# Patient Record
Sex: FEMALE | Race: OTHER | Employment: UNEMPLOYED | URBAN - METROPOLITAN AREA
[De-identification: names, ages, dates, MRNs, and addresses within clinical notes are randomized per-mention and may not be internally consistent; named-entity substitution may affect disease eponyms.]

---

## 2017-03-26 NOTE — TELEPHONE ENCOUNTER
Patient is requesting a refill of Singulair 10 MG- #90- 1 tablet by mouth daily. Patient was last seen for an office visit for cough 7-25-16 and per Dr. Donte Benitez progress notes to follow up in 2 weeks. No appointment scheduled as of 3-26-17.   Last refill o

## 2017-03-27 RX ORDER — MONTELUKAST SODIUM 10 MG/1
TABLET ORAL
Qty: 90 TABLET | Refills: 1 | OUTPATIENT
Start: 2017-03-27

## 2017-03-27 NOTE — TELEPHONE ENCOUNTER
Pt contacted and stts that she is no longer having acute allergy issues, denied f/u appt. , and stts that she did not request refill. Pt informed that for additional refills will need have appt prior. Pt verbalized understanding and agreed to plan of care.

## 2017-03-27 NOTE — TELEPHONE ENCOUNTER
Patient last seen on July 25, 2016 and advised to follow-up in 6 months. Refill request denied at this time.

## 2017-05-09 ENCOUNTER — HOSPITAL ENCOUNTER (OUTPATIENT)
Age: 52
Discharge: HOME | End: 2017-05-09
Attending: EMERGENCY MEDICINE
Payer: COMMERCIAL

## 2017-05-09 VITALS
HEIGHT: 63 IN | WEIGHT: 157 LBS | DIASTOLIC BLOOD PRESSURE: 94 MMHG | SYSTOLIC BLOOD PRESSURE: 134 MMHG | BODY MASS INDEX: 27.82 KG/M2 | HEART RATE: 95 BPM | TEMPERATURE: 99 F | RESPIRATION RATE: 16 BRPM | OXYGEN SATURATION: 100 %

## 2017-05-09 DIAGNOSIS — J20.9 ACUTE BRONCHITIS, UNSPECIFIED ORGANISM: Primary | ICD-10-CM

## 2017-05-09 PROCEDURE — 87430 STREP A AG IA: CPT

## 2017-05-09 PROCEDURE — 99214 OFFICE O/P EST MOD 30 MIN: CPT

## 2017-05-09 PROCEDURE — 99213 OFFICE O/P EST LOW 20 MIN: CPT

## 2017-05-09 RX ORDER — AMOXICILLIN 875 MG/1
875 TABLET, COATED ORAL 2 TIMES DAILY
Qty: 20 TABLET | Refills: 0 | Status: SHIPPED | OUTPATIENT
Start: 2017-05-09 | End: 2017-05-09

## 2017-05-09 RX ORDER — BENZONATATE 200 MG/1
200 CAPSULE ORAL 3 TIMES DAILY PRN
Qty: 21 CAPSULE | Refills: 0 | Status: SHIPPED | OUTPATIENT
Start: 2017-05-09 | End: 2017-05-16

## 2017-05-09 RX ORDER — BENZONATATE 200 MG/1
200 CAPSULE ORAL 3 TIMES DAILY PRN
Qty: 21 CAPSULE | Refills: 0 | Status: SHIPPED | OUTPATIENT
Start: 2017-05-09 | End: 2017-05-09

## 2017-05-09 RX ORDER — AMOXICILLIN 875 MG/1
875 TABLET, COATED ORAL 2 TIMES DAILY
Qty: 20 TABLET | Refills: 0 | Status: SHIPPED | OUTPATIENT
Start: 2017-05-09 | End: 2017-05-19

## 2017-05-09 NOTE — ED PROVIDER NOTES
Patient presents with:  Cough/URI      HPI:     Katlyn Khan is a 47year old female who presents for evaluation of a chief complaint of fever, sore throat and cough which is  dry. Location: Respiratory System.  Onset of symptoms was gradual startin Strep  amoxicillin 875 MG Oral Tab   Sig: Take 1 tablet (875 mg total) by mouth 2 (two) times daily. Dispense:  20 tablet   Refill:  0  benzonatate 200 MG Oral Cap   Sig: Take 1 capsule (200 mg total) by mouth 3 (three) times daily as needed for cough.

## 2017-07-10 RX ORDER — LEVOTHYROXINE SODIUM 0.15 MG/1
TABLET ORAL
Qty: 90 TABLET | Refills: 2 | Status: SHIPPED | OUTPATIENT
Start: 2017-07-10 | End: 2018-09-12

## 2017-08-09 ENCOUNTER — OFFICE VISIT (OUTPATIENT)
Dept: INTERNAL MEDICINE CLINIC | Facility: CLINIC | Age: 52
End: 2017-08-09

## 2017-08-09 VITALS
WEIGHT: 154 LBS | SYSTOLIC BLOOD PRESSURE: 122 MMHG | HEART RATE: 76 BPM | DIASTOLIC BLOOD PRESSURE: 79 MMHG | HEIGHT: 64 IN | BODY MASS INDEX: 26.29 KG/M2

## 2017-08-09 DIAGNOSIS — E03.9 ACQUIRED HYPOTHYROIDISM: ICD-10-CM

## 2017-08-09 DIAGNOSIS — E55.9 VITAMIN D DEFICIENCY: ICD-10-CM

## 2017-08-09 DIAGNOSIS — M25.571 ACUTE RIGHT ANKLE PAIN: Primary | ICD-10-CM

## 2017-08-09 PROCEDURE — 99214 OFFICE O/P EST MOD 30 MIN: CPT | Performed by: INTERNAL MEDICINE

## 2017-08-09 PROCEDURE — 99212 OFFICE O/P EST SF 10 MIN: CPT | Performed by: INTERNAL MEDICINE

## 2017-08-09 NOTE — PROGRESS NOTES
Jose Angel Ambrocio is a 47year old female. HPI:   1. Right Ankle Pain    Has been complaining of pain in left elbow last few weeks but had no specific injury. Has been feeling better the last few days. Hurts to lift items mildly.     2. Acquired Hypothy throat are clear  NECK: supple,no adenopathy,no bruits  LUNGS: clear to auscultation  CARDIO: RRR without murmur  GI: good BS's,no masses, HSM or tenderness  EXTREMITIES: no cyanosis, clubbing or edema  4th and 5th nail loeftfoot have thickened and darkene

## 2017-09-12 ENCOUNTER — NURSE TRIAGE (OUTPATIENT)
Dept: INTERNAL MEDICINE CLINIC | Facility: CLINIC | Age: 52
End: 2017-09-12

## 2017-09-12 NOTE — TELEPHONE ENCOUNTER
Per pt peeled skin on foot, baby toe. Now feeling pain with shoe on. Advise given. Pt verbalized understanding.    Reason for Disposition  • Caused by known bunion(s), corns, claw toes, mallet toes, or hammertoes    Protocols used: TOE PAIN-A-OH

## 2017-09-12 NOTE — TELEPHONE ENCOUNTER
Pt. has callus on both feet and pt. is having pain on R Foot after peeling off dead skin. Pt. States that it does not look infected but it does hurt when she has her shoe on. Pt. Wants to know if there is something that the dr can give her?

## 2017-10-23 ENCOUNTER — APPOINTMENT (OUTPATIENT)
Dept: LAB | Age: 52
End: 2017-10-23
Attending: INTERNAL MEDICINE
Payer: COMMERCIAL

## 2017-10-23 PROCEDURE — 80053 COMPREHEN METABOLIC PANEL: CPT | Performed by: INTERNAL MEDICINE

## 2017-10-23 PROCEDURE — 84443 ASSAY THYROID STIM HORMONE: CPT | Performed by: INTERNAL MEDICINE

## 2017-10-23 PROCEDURE — 81001 URINALYSIS AUTO W/SCOPE: CPT | Performed by: INTERNAL MEDICINE

## 2017-10-23 PROCEDURE — 85025 COMPLETE CBC W/AUTO DIFF WBC: CPT | Performed by: INTERNAL MEDICINE

## 2017-10-23 PROCEDURE — 80061 LIPID PANEL: CPT | Performed by: INTERNAL MEDICINE

## 2017-10-23 PROCEDURE — 82306 VITAMIN D 25 HYDROXY: CPT | Performed by: INTERNAL MEDICINE

## 2018-03-05 ENCOUNTER — OFFICE VISIT (OUTPATIENT)
Dept: INTERNAL MEDICINE CLINIC | Facility: CLINIC | Age: 53
End: 2018-03-05

## 2018-03-05 ENCOUNTER — HOSPITAL ENCOUNTER (OUTPATIENT)
Dept: GENERAL RADIOLOGY | Age: 53
Discharge: HOME | End: 2018-03-05
Attending: INTERNAL MEDICINE
Payer: COMMERCIAL

## 2018-03-05 VITALS
HEIGHT: 64 IN | SYSTOLIC BLOOD PRESSURE: 114 MMHG | RESPIRATION RATE: 20 BRPM | TEMPERATURE: 98 F | BODY MASS INDEX: 27.08 KG/M2 | WEIGHT: 158.63 LBS | DIASTOLIC BLOOD PRESSURE: 74 MMHG | HEART RATE: 78 BPM

## 2018-03-05 DIAGNOSIS — M54.41 RIGHT-SIDED LOW BACK PAIN WITH RIGHT-SIDED SCIATICA, UNSPECIFIED CHRONICITY: ICD-10-CM

## 2018-03-05 DIAGNOSIS — L81.9 DISCOLORATION OF SKIN OF TOE: Primary | ICD-10-CM

## 2018-03-05 PROCEDURE — 99212 OFFICE O/P EST SF 10 MIN: CPT | Performed by: INTERNAL MEDICINE

## 2018-03-05 PROCEDURE — 99214 OFFICE O/P EST MOD 30 MIN: CPT | Performed by: INTERNAL MEDICINE

## 2018-03-05 PROCEDURE — 72100 X-RAY EXAM L-S SPINE 2/3 VWS: CPT | Performed by: INTERNAL MEDICINE

## 2018-03-05 RX ORDER — METHYLPREDNISOLONE 4 MG/1
TABLET ORAL
Qty: 1 KIT | Refills: 0 | Status: SHIPPED | OUTPATIENT
Start: 2018-03-05 | End: 2018-05-14

## 2018-03-05 RX ORDER — CLOTRIMAZOLE 1 %
CREAM (GRAM) TOPICAL 2 TIMES DAILY
COMMUNITY
Start: 2017-12-18 | End: 2018-05-14

## 2018-03-05 RX ORDER — CYCLOBENZAPRINE HCL 5 MG
5 TABLET ORAL NIGHTLY PRN
Qty: 20 TABLET | Refills: 0 | Status: SHIPPED | OUTPATIENT
Start: 2018-03-05 | End: 2018-05-14

## 2018-03-05 NOTE — PROGRESS NOTES
HPI:    Patient ID: Cori Sorto is a 47year old female. Fungus Nails   This is a chronic (discolored   and  cream for  fungus not hepling  ) problem. The current episode started more than 1 year ago. The problem has been gradually worsening.  Rosita daily.   Disp:  Rfl:    methylPREDNISolone (MEDROL) 4 MG Oral Tablet Therapy Pack As directed. Disp: 1 kit Rfl: 0   Cyclobenzaprine HCl 5 MG Oral Tab Take 1 tablet (5 mg total) by mouth nightly as needed for Muscle spasms.  Disp: 20 tablet Rfl: 0   LEVOTHYR bx    Right-sided low back pain with right-sided sciatica, unspecified chronicity  -   Pt education weightReduction   refer  To physiatry  -PT  xr  Lumbar  Spine     Flexeril   5 mg qhs  As  Needed   Medrol  Dose  Pack   Side effects and directions of medi

## 2018-03-08 ENCOUNTER — TELEPHONE (OUTPATIENT)
Dept: OTHER | Age: 53
End: 2018-03-08

## 2018-03-08 NOTE — TELEPHONE ENCOUNTER
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     Advised patient t

## 2018-04-02 ENCOUNTER — OFFICE VISIT (OUTPATIENT)
Dept: PODIATRY CLINIC | Facility: CLINIC | Age: 53
End: 2018-04-02

## 2018-04-02 DIAGNOSIS — B35.1 ONYCHOMYCOSIS: Primary | ICD-10-CM

## 2018-04-02 PROCEDURE — 99212 OFFICE O/P EST SF 10 MIN: CPT | Performed by: PODIATRIST

## 2018-04-02 PROCEDURE — 99242 OFF/OP CONSLTJ NEW/EST SF 20: CPT | Performed by: PODIATRIST

## 2018-04-02 NOTE — PROGRESS NOTES
HPI:    Patient ID: Noman Danielle is a 46year old female.     HPI  This 54-year-old female presents as a new patient to me on consult from [address] patient presents primarily with a concern associated with the fourth left toe although she thinks t appropriate         ASSESSMENT/PLAN:   Onychomycosis  (primary encounter diagnosis)    No orders of the defined types were placed in this encounter.       Meds This Visit:  No prescriptions requested or ordered in this encounter    Imaging & Referrals:  Non

## 2018-05-14 ENCOUNTER — OFFICE VISIT (OUTPATIENT)
Dept: INTERNAL MEDICINE CLINIC | Facility: CLINIC | Age: 53
End: 2018-05-14

## 2018-05-14 VITALS
WEIGHT: 155 LBS | HEART RATE: 81 BPM | DIASTOLIC BLOOD PRESSURE: 77 MMHG | HEIGHT: 64 IN | BODY MASS INDEX: 26.46 KG/M2 | SYSTOLIC BLOOD PRESSURE: 120 MMHG

## 2018-05-14 DIAGNOSIS — K29.00 ACUTE GASTRITIS WITHOUT HEMORRHAGE, UNSPECIFIED GASTRITIS TYPE: ICD-10-CM

## 2018-05-14 DIAGNOSIS — L29.0 ANAL ITCHING: ICD-10-CM

## 2018-05-14 DIAGNOSIS — K64.9 HEMORRHOIDS, UNSPECIFIED HEMORRHOID TYPE: Primary | ICD-10-CM

## 2018-05-14 PROCEDURE — 99212 OFFICE O/P EST SF 10 MIN: CPT | Performed by: INTERNAL MEDICINE

## 2018-05-14 PROCEDURE — 99213 OFFICE O/P EST LOW 20 MIN: CPT | Performed by: INTERNAL MEDICINE

## 2018-05-14 RX ORDER — PANTOPRAZOLE SODIUM 40 MG/1
40 TABLET, DELAYED RELEASE ORAL
Qty: 30 TABLET | Refills: 0 | Status: SHIPPED | OUTPATIENT
Start: 2018-05-14 | End: 2018-06-10

## 2018-05-14 NOTE — PATIENT INSTRUCTIONS
Treating Hemorrhoids: Removal  If your symptoms persist, your healthcare provider may recommend removing the hemorrhoid. This can be done in your healthcare provider's office or at a surgical center. In most cases, no special preparation is needed.  Keep · Numbing the hemorrhoid. You’ll be asked to lie or kneel on a table. The hemorrhoid is then injected with a local anesthetic. This may cause some discomfort for a moment.  But within a short time your healthcare provider will be able to remove the hemorrho · Take sitz baths. Taking a sitz bath means sitting in a few inches of warm bath water. Soaking for 10 minutes twice a day can provide welcome relief from painful hemorrhoids. It can also help the area stay clean.   · Develop good bowel habits. Use the bath Along with a high-fiber diet, drinking more water can help ease constipation. This is because insoluble fiber absorbs water, making stools soft and bulky. Be sure to drink plenty of water throughout the day.  Drinking fruit juices, such as prune juice or ap © 5700-9990 [employer] [address]. [address] [facility] [place] [facility] [place]. All rights reserved. This information is not intended as a substitute for professional medical care. Always follow your healthcare professional's instructions.         Jesus Rodriguez · Internal hemorrhoids often happen in clusters around the wall of the anal canal. They are usually painless. But they may prolapse (protrude out of the anus) due to straining or pressure from hard stool.  After the bowel movement is over, they may then red

## 2018-05-14 NOTE — PROGRESS NOTES
Destiny Anderson is a 46year old female.   Patient presents with:  Loose Stools: after eating spicy food and feels burning sensation      HPI:   Patient comes as an urgent visit  Usually sees Dr. Radha Thompson  c/c burning in the anal area  c/o burning and itc denies abdominal pain and denies heartburn, nausea or vomiting, no diarrhea or constipation  MUS: No back pain, joint pain, + muscle pain- right leg pain   NEURO: denies headaches , anxiety, depression    EXAM:   /77 (BP Location: Right arm, Patient

## 2018-05-18 ENCOUNTER — OFFICE VISIT (OUTPATIENT)
Dept: PODIATRY CLINIC | Facility: CLINIC | Age: 53
End: 2018-05-18

## 2018-05-18 VITALS — HEIGHT: 64 IN | BODY MASS INDEX: 26.46 KG/M2 | WEIGHT: 155 LBS

## 2018-05-18 DIAGNOSIS — B35.1 ONYCHOMYCOSIS: Primary | ICD-10-CM

## 2018-05-18 PROCEDURE — 99212 OFFICE O/P EST SF 10 MIN: CPT | Performed by: PODIATRIST

## 2018-05-18 NOTE — PROGRESS NOTES
HPI:    Patient ID: Aleida Bae is a 46year old female. HPI  This 50-year-old female presents for reevaluation in reference to the fourth and fifth toenails of the left foot.   She has no pain and has not been putting medications on them  Review of

## 2018-05-19 ENCOUNTER — HOSPITAL (OUTPATIENT)
Dept: OTHER | Age: 53
End: 2018-05-19
Attending: EMERGENCY MEDICINE

## 2018-06-10 RX ORDER — PANTOPRAZOLE SODIUM 40 MG/1
TABLET, DELAYED RELEASE ORAL
Qty: 30 TABLET | Refills: 0 | Status: SHIPPED | OUTPATIENT
Start: 2018-06-10 | End: 2019-02-06

## 2018-06-10 NOTE — TELEPHONE ENCOUNTER
Refill Protocol Appointment Criteria  · Appointment scheduled in the past 12 months or in the next 3 months  Recent Outpatient Visits            3 weeks ago Onychomycosis    [facility], [address]  [place], [place]

## 2018-09-13 RX ORDER — LEVOTHYROXINE SODIUM 0.15 MG/1
TABLET ORAL
Qty: 90 TABLET | Refills: 0 | Status: SHIPPED | OUTPATIENT
Start: 2018-09-13 | End: 2019-06-06

## 2018-09-13 NOTE — TELEPHONE ENCOUNTER
Requested Prescriptions     Pending Prescriptions Disp Refills   • LEVOTHYROXINE SODIUM 150 MCG Oral Tab [Pharmacy Med Name: LEVOTHYROXINE 150 MCG TABLET] 90 tablet 0     Sig: TAKE 1/2 TABLETS (75 MCG TOTAL) BY MOUTH BEFORE BREAKFAST.          Refill approv

## 2018-09-13 NOTE — TELEPHONE ENCOUNTER
Hypothyroid Medications  Protocol Criteria:  Appointment scheduled in the past 12 months or the next 3 months  TSH resulted in the past 12 months that is normal  Recent Outpatient Visits            3 months ago [facility]ychomycosis    [facility]

## 2018-11-08 ENCOUNTER — TELEPHONE (OUTPATIENT)
Dept: INTERNAL MEDICINE CLINIC | Facility: CLINIC | Age: 53
End: 2018-11-08

## 2018-11-08 DIAGNOSIS — Z01.419 WELL WOMAN EXAM: Primary | ICD-10-CM

## 2018-11-08 NOTE — TELEPHONE ENCOUNTER
Orders entered. RN placed call to patient to advise of orders and to offer appt for annual exam.  Per patient, she is scheduled for pap and annual exam with PCP office on 11/20. Pt is undecided whether she would like to schedule with [facility] instead.   RN a

## 2018-11-13 ENCOUNTER — TELEPHONE (OUTPATIENT)
Dept: INTERNAL MEDICINE CLINIC | Facility: CLINIC | Age: 53
End: 2018-11-13

## 2018-11-13 NOTE — TELEPHONE ENCOUNTER
Pt requesting orders for annual mammogram be added to the chart. Please call back with confirmation once added. Pt states that is she does not answer that a detailed message can be left on her VM.

## 2018-11-21 ENCOUNTER — OFFICE VISIT (OUTPATIENT)
Dept: OBGYN CLINIC | Facility: CLINIC | Age: 53
End: 2018-11-21
Payer: COMMERCIAL

## 2018-11-21 VITALS
DIASTOLIC BLOOD PRESSURE: 85 MMHG | HEART RATE: 80 BPM | WEIGHT: 155 LBS | BODY MASS INDEX: 27 KG/M2 | SYSTOLIC BLOOD PRESSURE: 125 MMHG

## 2018-11-21 DIAGNOSIS — Z01.419 ENCOUNTER FOR GYNECOLOGICAL EXAMINATION WITHOUT ABNORMAL FINDING: Primary | ICD-10-CM

## 2018-11-21 PROCEDURE — 99396 PREV VISIT EST AGE 40-64: CPT | Performed by: OBSTETRICS & GYNECOLOGY

## 2018-11-21 NOTE — PROGRESS NOTES
HPI:    Patient ID: Andrew Arguelles is a 46year old female. Patient here for routine exam.  No C/Os. Has order for mammogram.  To schedule. LPS 4/2016, WNL with - HPV. Next pap due next year.   Daughter finishing first semester of Pre-med at [employer] for gynecological examination without abnormal finding  (primary encounter diagnosis)   F/U Labs. Encouraged monthly SBE. Discussed diet and exercise. No orders of the defined types were placed in this encounter.       Meds This Visit:  Requested Mickie Szymanski

## 2018-12-12 ENCOUNTER — NURSE TRIAGE (OUTPATIENT)
Dept: OTHER | Age: 53
End: 2018-12-12

## 2018-12-12 RX ORDER — VALACYCLOVIR HYDROCHLORIDE 500 MG/1
500 TABLET, FILM COATED ORAL 2 TIMES DAILY
Qty: 6 TABLET | Refills: 2 | Status: SHIPPED | OUTPATIENT
Start: 2018-12-12 | End: 2019-07-22

## 2018-12-12 RX ORDER — ACYCLOVIR 50 MG/G
1 CREAM TOPICAL
Qty: 5 G | Refills: 2 | Status: SHIPPED | OUTPATIENT
Start: 2018-12-12 | End: 2019-07-22

## 2018-12-12 NOTE — TELEPHONE ENCOUNTER
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  Has

## 2019-01-04 ENCOUNTER — TELEPHONE (OUTPATIENT)
Dept: OBGYN CLINIC | Facility: CLINIC | Age: 54
End: 2019-01-04

## 2019-01-22 ENCOUNTER — HOSPITAL ENCOUNTER (OUTPATIENT)
Dept: MAMMOGRAPHY | Facility: HOSPITAL | Age: 54
Discharge: HOME | End: 2019-01-22
Attending: OBSTETRICS & GYNECOLOGY
Payer: COMMERCIAL

## 2019-01-22 DIAGNOSIS — Z01.419 WELL WOMAN EXAM: ICD-10-CM

## 2019-01-22 PROCEDURE — 77063 BREAST TOMOSYNTHESIS BI: CPT | Performed by: OBSTETRICS & GYNECOLOGY

## 2019-01-22 PROCEDURE — 77067 SCR MAMMO BI INCL CAD: CPT | Performed by: OBSTETRICS & GYNECOLOGY

## 2019-02-04 ENCOUNTER — NURSE TRIAGE (OUTPATIENT)
Dept: OTHER | Age: 54
End: 2019-02-04

## 2019-02-04 NOTE — TELEPHONE ENCOUNTER
appt made for Wednesday- c/c rigth ingrown toenail-pain, no redness, left breast pain x 1 month, had TRACY -norm   Reason for Disposition  • Patient wants to be seen    Protocols used: TOE PAIN-A-OH

## 2019-02-06 ENCOUNTER — OFFICE VISIT (OUTPATIENT)
Dept: INTERNAL MEDICINE CLINIC | Facility: CLINIC | Age: 54
End: 2019-02-06
Payer: COMMERCIAL

## 2019-02-06 VITALS
WEIGHT: 155.81 LBS | HEART RATE: 80 BPM | HEIGHT: 64 IN | RESPIRATION RATE: 20 BRPM | DIASTOLIC BLOOD PRESSURE: 81 MMHG | TEMPERATURE: 98 F | BODY MASS INDEX: 26.6 KG/M2 | SYSTOLIC BLOOD PRESSURE: 123 MMHG

## 2019-02-06 DIAGNOSIS — L60.0 INGROWING TOENAIL: ICD-10-CM

## 2019-02-06 DIAGNOSIS — R92.2 DENSE BREASTS: Primary | ICD-10-CM

## 2019-02-06 DIAGNOSIS — M94.0 COSTOCHONDRITIS: ICD-10-CM

## 2019-02-06 DIAGNOSIS — M25.532 LEFT WRIST PAIN: ICD-10-CM

## 2019-02-06 PROCEDURE — 99212 OFFICE O/P EST SF 10 MIN: CPT | Performed by: INTERNAL MEDICINE

## 2019-02-06 PROCEDURE — 99214 OFFICE O/P EST MOD 30 MIN: CPT | Performed by: INTERNAL MEDICINE

## 2019-02-07 PROBLEM — R92.2 DENSE BREASTS: Status: ACTIVE | Noted: 2019-02-07

## 2019-02-07 PROBLEM — L60.0 INGROWING TOENAIL: Status: ACTIVE | Noted: 2019-02-07

## 2019-02-07 PROBLEM — R92.30 DENSE BREASTS: Status: ACTIVE | Noted: 2019-02-07

## 2019-02-07 NOTE — PROGRESS NOTES
HPI:    Patient ID: Shimon Washington is a 46year old female. Patient presents with:  Breast Pain: Pt state that she has had left breast pain for the past 2 months that comes and goes.    Ingrown Toenail: Pt state that she has the right great toe that Disp: 6 tablet Rfl: 2   Acyclovir 5 % External Cream Apply 1 Application topically every 3 (three) hours. Disp: 5 g Rfl: 2   LEVOTHYROXINE SODIUM 150 MCG Oral Tab TAKE 1/2 TABLETS (75 MCG TOTAL) BY MOUTH BEFORE BREAKFAST.  Disp: 90 tablet Rfl: 0     Allergi warm and dry. toenails- normal no  erythema or  Tenderness  -nails or skin around    Psychiatric: She has a normal mood and affect. Her behavior is normal.   Nursing note and vitals reviewed. Blood pressure 123/81, pulse 80, temperature 98.1 °F (36.

## 2019-03-09 NOTE — TELEPHONE ENCOUNTER
Please review; protocol failed.     Hypothyroid Medications  Protocol Criteria:  Appointment scheduled in the past 12 months or the next 3 months  TSH resulted in the past 12 months that is normal  Recent Outpatient Visits            1 month ago Dense breasts    [facility], [address] [facility], [place] Oanh Elliott MD    Office Visit    3 months ago Encounter for gynecological examination without abnormal finding    [facility], [facility], [address] [facility], [place], Leatha Rivero MD    Office Visit    9 months ago Onychomycosis    [facility], [address] [facility], [address] [place] [address]    Office Visit    9 months ago Hemorrhoids, unspecified hemorrhoid type    [facility], [address] [facility], Tom Ramirez MD    Office Visit    11 months ago Onychomycosis    [facility], [address] [facility],[address], [address] [facility], [place], [place]    Office Visit          Lab Results   Component Value Date    TSH 1.68 10/23/2017

## 2019-03-10 NOTE — TELEPHONE ENCOUNTER
Please review; protocol failed.     Hypothyroid Medications  Protocol Criteria:  Appointment scheduled in the past 12 months or the next 3 months  TSH resulted in the past 12 months that is normal  Recent Outpatient Visits            1 month ago Dense breasts    [facility], [address] [place] [address], [place] Judye Daniels, MD    Office Visit    3 months ago Encounter for gynecological examination without abnormal finding    [facility], [address] [facility], [place], Birgit Merchant MD    Office Visit    9 months ago Onychomycosis    [facility], [address], [address] [place], [place]    Office Visit    9 months ago Hemorrhoids, unspecified hemorrhoid type    [facility], [facility], [address] [place], Arnita Collet, MD    Office Visit    11 months ago Onychomycosis    [facility], [address], [address] [place], [place]    Office Visit          Lab Results   Component Value Date    TSH 1.68 10/23/2017

## 2019-05-22 RX ORDER — LEVOTHYROXINE SODIUM 0.15 MG/1
TABLET ORAL
Qty: 90 TABLET | Refills: 0 | OUTPATIENT
Start: 2019-05-22

## 2019-06-06 NOTE — TELEPHONE ENCOUNTER
Pt is requesting refill , pt is out of medication     LEVOTHYROXINE SODIUM 150 MCG Oral Tab TAKE 1/2 TABLETS (75 MCG TOTAL) BY MOUTH BEFORE BREAKFAST.  Disp: 90 tablet Rfl: 0

## 2019-06-07 RX ORDER — LEVOTHYROXINE SODIUM 0.15 MG/1
TABLET ORAL
Qty: 90 TABLET | Refills: 0 | Status: SHIPPED | OUTPATIENT
Start: 2019-06-07 | End: 2019-12-10

## 2019-07-22 ENCOUNTER — OFFICE VISIT (OUTPATIENT)
Dept: INTERNAL MEDICINE CLINIC | Facility: CLINIC | Age: 54
End: 2019-07-22
Payer: COMMERCIAL

## 2019-07-22 ENCOUNTER — NURSE TRIAGE (OUTPATIENT)
Dept: OTHER | Age: 54
End: 2019-07-22

## 2019-07-22 VITALS
TEMPERATURE: 99 F | HEIGHT: 64 IN | SYSTOLIC BLOOD PRESSURE: 131 MMHG | BODY MASS INDEX: 26.32 KG/M2 | WEIGHT: 154.19 LBS | HEART RATE: 85 BPM | DIASTOLIC BLOOD PRESSURE: 85 MMHG

## 2019-07-22 DIAGNOSIS — A60.09 GENITAL HERPES IN WOMEN: ICD-10-CM

## 2019-07-22 DIAGNOSIS — J02.0 PHARYNGITIS DUE TO STREPTOCOCCUS SPECIES: ICD-10-CM

## 2019-07-22 DIAGNOSIS — R05.9 COUGH: ICD-10-CM

## 2019-07-22 DIAGNOSIS — H66.90 ACUTE OTITIS MEDIA, UNSPECIFIED OTITIS MEDIA TYPE: Primary | ICD-10-CM

## 2019-07-22 PROCEDURE — 99214 OFFICE O/P EST MOD 30 MIN: CPT | Performed by: INTERNAL MEDICINE

## 2019-07-22 PROCEDURE — 99212 OFFICE O/P EST SF 10 MIN: CPT | Performed by: INTERNAL MEDICINE

## 2019-07-22 RX ORDER — ACYCLOVIR 50 MG/G
1 CREAM TOPICAL
Qty: 5 G | Refills: 2 | Status: SHIPPED | OUTPATIENT
Start: 2019-07-22 | End: 2020-10-14 | Stop reason: ALTCHOICE

## 2019-07-22 RX ORDER — VALACYCLOVIR HYDROCHLORIDE 500 MG/1
500 TABLET, FILM COATED ORAL 2 TIMES DAILY
Qty: 6 TABLET | Refills: 2 | Status: SHIPPED | OUTPATIENT
Start: 2019-07-22 | End: 2020-09-09 | Stop reason: ALTCHOICE

## 2019-07-22 RX ORDER — BENZONATATE 100 MG/1
100 CAPSULE ORAL 2 TIMES DAILY PRN
Qty: 10 CAPSULE | Refills: 0 | Status: SHIPPED | OUTPATIENT
Start: 2019-07-22 | End: 2019-08-06

## 2019-07-22 RX ORDER — AMOXICILLIN AND CLAVULANATE POTASSIUM 875; 125 MG/1; MG/1
1 TABLET, FILM COATED ORAL 2 TIMES DAILY
Qty: 14 TABLET | Refills: 0 | Status: SHIPPED | OUTPATIENT
Start: 2019-07-22 | End: 2019-07-29

## 2019-07-22 NOTE — TELEPHONE ENCOUNTER
Action Requested: Summary for Provider     []  Critical Lab, Recommendations Needed  [] Need Additional Advice  []   FYI    []   Need Orders  [] Need Medications Sent to Pharmacy  []  Other     SUMMARY: pt asking to schedule appt today, states she has coug

## 2019-07-22 NOTE — PROGRESS NOTES
Demi Villagran is a 53year old female.   Patient presents with:  URI: cough, congestion, sneezing  and watery eyes       HPI:   Pt comes as an urgent visit   C/c cold symp x 3 days   C/o very bad cold x 3 days -- chest pain from coughing so much , ea Position: Sitting, Cuff Size: adult)   Pulse 85   Temp 99.1 °F (37.3 °C) (Oral)   Ht 5' 4\" (1.626 m)   Wt 154 lb 3.2 oz (69.9 kg)   LMP 01/05/2016   BMI 26.47 kg/m²   GENERAL: well developed, well nourished,in no apparent distress  SKIN: no rashes,no susp

## 2019-07-22 NOTE — PATIENT INSTRUCTIONS
Preventing Common Respiratory Infections    Respiratory infections such as colds and influenza (“the flu”) are common in winter. These infections are often caused by viruses. They may share some symptoms, but not all respiratory infections are the same. A flu vaccine protects you from influenza (but not other colds or infections). Get a vaccine each fall, before flu season starts. This can be done at a clinic, healthcare provider’s office, drugstore, senior center, or through your workplace.   Get pneumoco

## 2019-07-26 ENCOUNTER — TELEPHONE (OUTPATIENT)
Dept: INTERNAL MEDICINE CLINIC | Facility: CLINIC | Age: 54
End: 2019-07-26

## 2019-07-26 RX ORDER — GUAIFENESIN AND CODEINE PHOSPHATE 100; 10 MG/5ML; MG/5ML
5 SOLUTION ORAL 2 TIMES DAILY PRN
Qty: 118 ML | Refills: 0 | Status: SHIPPED | OUTPATIENT
Start: 2019-07-26 | End: 2019-08-06

## 2019-07-26 NOTE — TELEPHONE ENCOUNTER
Patient called reports she has no improvement , had OV with MA on 7/22/19    Has been taking her antibiotics and benzonatate with very little relief.      Reports has runny nose draining,  Constant  productive cough with yellow sputum    Has non cardiac angelica

## 2019-07-26 NOTE — TELEPHONE ENCOUNTER
Call patient and spoke to her–chest hurts from coughing and head hurts   Pills not helping , she states that she was given a liquid which helped with her cough by Dr. Griselda Dyson years ago and would like to try that because she knows it will help her  Review

## 2019-08-06 ENCOUNTER — OFFICE VISIT (OUTPATIENT)
Dept: INTERNAL MEDICINE CLINIC | Facility: CLINIC | Age: 54
End: 2019-08-06
Payer: COMMERCIAL

## 2019-08-06 VITALS
DIASTOLIC BLOOD PRESSURE: 68 MMHG | HEIGHT: 64 IN | HEART RATE: 88 BPM | SYSTOLIC BLOOD PRESSURE: 110 MMHG | WEIGHT: 152.81 LBS | BODY MASS INDEX: 26.09 KG/M2

## 2019-08-06 DIAGNOSIS — M79.671 RIGHT FOOT PAIN: ICD-10-CM

## 2019-08-06 DIAGNOSIS — E03.9 ACQUIRED HYPOTHYROIDISM: Primary | ICD-10-CM

## 2019-08-06 PROCEDURE — 99212 OFFICE O/P EST SF 10 MIN: CPT | Performed by: INTERNAL MEDICINE

## 2019-08-06 PROCEDURE — 99213 OFFICE O/P EST LOW 20 MIN: CPT | Performed by: INTERNAL MEDICINE

## 2019-08-06 NOTE — PROGRESS NOTES
Kasi Matthew is a 53year old female. Patient presents with:   Foot Pain: right started 3 months ago       HPI:   Pt comes as an urgent visit   C/c foot pain  C/o right foot pain x 3 mns -come and goes   No falls trauma or injury that she is aware 152 lb 12.8 oz (69.3 kg)   LMP 01/05/2016   BMI 26.23 kg/m²   GENERAL: well developed, well nourished,in no apparent distress  SKIN: no rashes,no suspicious lesions  HEENT: atraumatic, normocephalic  LUNGS: clear to auscultation, no wheeze  CARDIO: RRR wit

## 2019-08-06 NOTE — PATIENT INSTRUCTIONS
Hypothyroidism    You have hypothyroidism. This means your thyroid gland is not making enough thyroid hormone. This hormone is vital to body growth and metabolism. If you don’t make enough, many body processes slow down.  This can cause symptoms throughou · Don’t take other medicines with your thyroid hormone pill without checking with your provider first.  · Tell your provider if you have any side effects from your medicines that bother you, especially any chest pain or irregular heartbeats.   · Never freeman

## 2019-08-14 ENCOUNTER — OFFICE VISIT (OUTPATIENT)
Dept: INTERNAL MEDICINE CLINIC | Facility: CLINIC | Age: 54
End: 2019-08-14
Payer: COMMERCIAL

## 2019-08-14 VITALS
WEIGHT: 150 LBS | BODY MASS INDEX: 25.61 KG/M2 | HEART RATE: 87 BPM | HEIGHT: 64 IN | DIASTOLIC BLOOD PRESSURE: 80 MMHG | SYSTOLIC BLOOD PRESSURE: 122 MMHG

## 2019-08-14 DIAGNOSIS — R05.9 COUGH: Primary | ICD-10-CM

## 2019-08-14 PROCEDURE — 99212 OFFICE O/P EST SF 10 MIN: CPT | Performed by: INTERNAL MEDICINE

## 2019-08-14 PROCEDURE — 99214 OFFICE O/P EST MOD 30 MIN: CPT | Performed by: INTERNAL MEDICINE

## 2019-08-14 RX ORDER — FEXOFENADINE HCL 180 MG/1
180 TABLET ORAL DAILY
Qty: 30 TABLET | Refills: 0 | Status: SHIPPED | OUTPATIENT
Start: 2019-08-14 | End: 2020-09-09

## 2019-08-14 RX ORDER — FLUTICASONE PROPIONATE 50 MCG
2 SPRAY, SUSPENSION (ML) NASAL DAILY
Qty: 1 BOTTLE | Refills: 0 | Status: SHIPPED | OUTPATIENT
Start: 2019-08-14 | End: 2019-09-05

## 2019-08-14 NOTE — PROGRESS NOTES
HPI:    Patient ID: Charlotte Desouza is a 53year old female.   Patient presents with:  Cough: follow up    Patient in office today for cough -patient states cough is lasting for over 1 month ,was given  antibiotic and the cough syrup did not help at al Negative for abdominal pain, constipation, diarrhea, heartburn, nausea and vomiting. Genitourinary: Negative for dysuria and frequency. Musculoskeletal: Negative for myalgias. Skin: Negative for pallor and rash.    Neurological: Positive for headaches reactive to light. Conjunctivae, EOM and lids are normal. Right eye exhibits no discharge. Left eye exhibits no discharge. Right conjunctiva is not injected. Left conjunctiva is not injected. No scleral icterus. Neck: Neck supple. No JVD present.  No thyr 0     Si sprays by Each Nare route daily.  Apply two puffs in each nostril once daily for 1-2 weeks then as needed       Imaging & Referrals:  None       PB#[id number]

## 2019-09-05 RX ORDER — FLUTICASONE PROPIONATE 50 MCG
SPRAY, SUSPENSION (ML) NASAL
Qty: 3 BOTTLE | Refills: 1 | Status: SHIPPED | OUTPATIENT
Start: 2019-09-05

## 2019-12-10 RX ORDER — LEVOTHYROXINE SODIUM 0.15 MG/1
TABLET ORAL
Qty: 45 TABLET | Refills: 1 | Status: SHIPPED | OUTPATIENT
Start: 2019-12-10 | End: 2020-06-09

## 2020-06-09 RX ORDER — LEVOTHYROXINE SODIUM 0.15 MG/1
TABLET ORAL
Qty: 45 TABLET | Refills: 1 | Status: SHIPPED | OUTPATIENT
Start: 2020-06-09 | End: 2020-12-18

## 2020-08-19 ENCOUNTER — OFFICE VISIT (OUTPATIENT)
Dept: INTERNAL MEDICINE CLINIC | Facility: CLINIC | Age: 55
End: 2020-08-19

## 2020-08-19 VITALS
HEIGHT: 64 IN | WEIGHT: 152.19 LBS | BODY MASS INDEX: 25.98 KG/M2 | DIASTOLIC BLOOD PRESSURE: 80 MMHG | HEART RATE: 79 BPM | SYSTOLIC BLOOD PRESSURE: 126 MMHG

## 2020-08-19 DIAGNOSIS — G47.9 SLEEPING DIFFICULTIES: ICD-10-CM

## 2020-08-19 DIAGNOSIS — E03.9 ACQUIRED HYPOTHYROIDISM: Primary | ICD-10-CM

## 2020-08-19 DIAGNOSIS — Z00.00 PHYSICAL EXAM: ICD-10-CM

## 2020-08-19 PROCEDURE — 3074F SYST BP LT 130 MM HG: CPT | Performed by: INTERNAL MEDICINE

## 2020-08-19 PROCEDURE — 99213 OFFICE O/P EST LOW 20 MIN: CPT | Performed by: INTERNAL MEDICINE

## 2020-08-19 PROCEDURE — 3008F BODY MASS INDEX DOCD: CPT | Performed by: INTERNAL MEDICINE

## 2020-08-19 PROCEDURE — 3079F DIAST BP 80-89 MM HG: CPT | Performed by: INTERNAL MEDICINE

## 2020-08-19 NOTE — PROGRESS NOTES
HPI:    Patient ID: Nat Epps is a 63year old female. Patient presents with:  Sleep Problem: has not been able to sleep for four days, neck pain also began 4 days ago     Patient in office today for sleep problem  , pt state lost job  3 /20 . as needed 30 tablet 0   • valACYclovir HCl (VALTREX) 500 MG Oral Tab Take 1 tablet (500 mg total) by mouth 2 (two) times daily. 6 tablet 2   • Acyclovir 5 % External Cream Apply 1 Application topically every 3 (three) hours.  5 g 2     Allergies:No Known Al days patient states she is not sure what is the reason but think it might be her thyroid medicine but she did not have a blood test for long time       Nursing note and vitals reviewed.      Blood pressure 126/80, pulse 79, height 5' 4\" (1.626 m), weight 1

## 2020-08-22 ENCOUNTER — TELEPHONE (OUTPATIENT)
Dept: INTERNAL MEDICINE CLINIC | Facility: CLINIC | Age: 55
End: 2020-08-22

## 2020-08-22 RX ORDER — ZOLPIDEM TARTRATE 5 MG/1
5 TABLET ORAL NIGHTLY PRN
Qty: 30 TABLET | Refills: 0 | Status: SHIPPED | OUTPATIENT
Start: 2020-08-22 | End: 2020-09-17

## 2020-08-22 NOTE — TELEPHONE ENCOUNTER
Spoke with the patient who reports she saw Dr. Jes Alvarado on 8/19/20 due to insomnia and neck pain. Patient reports her neck pain has resolved but she is still having trouble sleeping.  She reports Dr. Jes Alvarado advised her to take OTC melatonin 10mg QHS

## 2020-08-23 NOTE — TELEPHONE ENCOUNTER
Noted , patient needs to complete blood test I have abnormal thyroid hormone?   In meantime time   Ambien 5 mg sent to the pharmacy to be used at night for sleep only as needed    Patient to let me know how she is doing within 1 week and follow-up 3 to 4 we

## 2020-08-24 NOTE — TELEPHONE ENCOUNTER
Spoke with patient (name and  verified). Patient informed of message below. Patient had blood work done Saturday, 2020 at [address] [facility]. She is asking about test results.     Please advise

## 2020-08-26 NOTE — TELEPHONE ENCOUNTER
Patient calling regarding her results done at outside facility. She requested the office fax number for Dr. Reji Verma so that her results from her last blood work can be sent to the doctor.  Provided the fax to Dr. Bk Robles office [phone]

## 2020-08-27 NOTE — TELEPHONE ENCOUNTER
Patient asking if you were able to view and comment on the blood work results faxed over from [facility] yesterday.

## 2020-08-28 NOTE — TELEPHONE ENCOUNTER
Phoned [facility] @ [phone] they will refax labs over to [phone] with Naveen states she is having a very hard time sleeping and would really like her results. Awaiting on fax to return patients call upon review.

## 2020-08-29 NOTE — TELEPHONE ENCOUNTER
Nina Martinez     Fax received after I left office - can you ,please  Get  Results  and  Call me .     Thanks

## 2020-09-01 NOTE — TELEPHONE ENCOUNTER
For the allergy symptoms-patient can use Allegra 180 mg daily , it is not sedating and can use with zolpidem    If the symptoms are very mild can also try the nasal spray Flonase 2 puffs in each nostril     With more severe symptoms she can take Flonase pl

## 2020-09-01 NOTE — TELEPHONE ENCOUNTER
Spoke with patient and relayed MD message below. Patient expressed understanding. No further questions at this time.

## 2020-09-01 NOTE — TELEPHONE ENCOUNTER
Pt calling back, reviewed MD message. Dr Ly Zamora can you clarify Elliot Martin is on the feels\" -can use Allegra 180 mg     Pt states she snores if she doesn't take the sleeping pill. Pt asking if she can take allegra and the Zolpidem together.   Pt also

## 2020-09-01 NOTE — TELEPHONE ENCOUNTER
Called  patient no answer left message regarding blood test   no anemia  Mild is on the feels-can use Allegra 180 mg if needed for postnasal drainage runny stuffy nose    Sodium potassium normal kidneys and liver function normal thyroid hormone within norm

## 2020-09-09 ENCOUNTER — MED REC SCAN ONLY (OUTPATIENT)
Dept: INTERNAL MEDICINE CLINIC | Facility: CLINIC | Age: 55
End: 2020-09-09

## 2020-09-09 ENCOUNTER — OFFICE VISIT (OUTPATIENT)
Dept: INTERNAL MEDICINE CLINIC | Facility: CLINIC | Age: 55
End: 2020-09-09

## 2020-09-09 VITALS
BODY MASS INDEX: 24.92 KG/M2 | HEIGHT: 64 IN | WEIGHT: 146 LBS | DIASTOLIC BLOOD PRESSURE: 81 MMHG | SYSTOLIC BLOOD PRESSURE: 123 MMHG | HEART RATE: 76 BPM

## 2020-09-09 DIAGNOSIS — G47.00 INSOMNIA, UNSPECIFIED TYPE: ICD-10-CM

## 2020-09-09 DIAGNOSIS — E03.9 ACQUIRED HYPOTHYROIDISM: Primary | ICD-10-CM

## 2020-09-09 PROCEDURE — 3079F DIAST BP 80-89 MM HG: CPT | Performed by: INTERNAL MEDICINE

## 2020-09-09 PROCEDURE — 3074F SYST BP LT 130 MM HG: CPT | Performed by: INTERNAL MEDICINE

## 2020-09-09 PROCEDURE — 3008F BODY MASS INDEX DOCD: CPT | Performed by: INTERNAL MEDICINE

## 2020-09-09 PROCEDURE — 99213 OFFICE O/P EST LOW 20 MIN: CPT | Performed by: INTERNAL MEDICINE

## 2020-09-09 NOTE — PROGRESS NOTES
for you tomorrow and she have brought them in their HPI:    Patient ID: Mike Guardado is a 63year old female.   Patient presents with:  Sleep Problem    Patient in office today for sleep     difficulties patient states she feels much better with Amb PROPIONATE 50 MCG/ACT Nasal Suspension PLEASE SEE ATTACHED FOR DETAILED DIRECTIONS 3 Bottle 1   • Acyclovir 5 % External Cream Apply 1 Application topically every 3 (three) hours.  5 g 2     Allergies:No Known Allergies   PHYSICAL EXAM:   Physical Exam   Co very after himself and very selfish      Patient states that Ambien works for her and she is able to sleep at least 5 to 6 hours like to have a refills   Nursing note and vitals reviewed.      Blood pressure 123/81, pulse 76, height 5' 4\" (1.626 m), weight

## 2020-09-14 ENCOUNTER — TELEPHONE (OUTPATIENT)
Dept: INTERNAL MEDICINE CLINIC | Facility: CLINIC | Age: 55
End: 2020-09-14

## 2020-09-14 NOTE — TELEPHONE ENCOUNTER
Per patient her medication Ambien dosage is not helping her and she's tired due to it's not helping her to go to sleep. Patient is asking if she can have another medication. She tried to increased more half a dose but not helping anymore. Patient would like to know what to do next.

## 2020-09-14 NOTE — TELEPHONE ENCOUNTER
Dr Gonzalo Cam, patient took one tablet last night without sleep, just drowsiness, she took 1/2 tablet 2 hours later but did not feel that she actually fell asleep and stayed asleep for the rest of the night, please advise what she can take tonight?   Arik

## 2020-09-14 NOTE — TELEPHONE ENCOUNTER
Pt states she saw doctor last week and was given a prescription for a sleeping pill. Pt state that med is not helping her sleep, she started taking 1 1/2 tablets of Zolpidem rather that 1 tablet as prescribed.  Pt states she thinks she is having anxiety, sh

## 2020-09-14 NOTE — TELEPHONE ENCOUNTER
Patient can see me tomorrow in [address] [place] [address] office 3:20 PM - approved appointment in office visit .

## 2020-09-16 NOTE — PROGRESS NOTES
for you tomorrow and she have brought them in their HPI:    Patient ID: Ari Silva is a 63year old female.   Patient presents with:  Sleep Problem: Stts zolipdem is not helping at all  Anxiety  Patient presents today for follow-up on her anxiety wheezing. Cardiovascular: Negative for chest pain, palpitations and leg swelling. Gastrointestinal: Negative for abdominal pain, constipation, diarrhea, nausea and vomiting. Genitourinary: Negative for dysuria and frequency.    Musculoskeletal: Negat No thyromegaly present. Cardiovascular: Normal rate, regular rhythm and normal heart sounds. No murmur heard. Pulmonary/Chest: Effort normal and breath sounds normal. No respiratory distress. She has no wheezes. She has no rales. Abdominal: Soft.  Sh add  Ambien 5 mg half of the tablet or whole tablet if needed  Again side effects directions discussed with patient mostly sedation drowsiness  Patient agrees with the plan verbalized understanding compliance        gerd  Patient advised to avoid spicy alban

## 2020-09-17 RX ORDER — ZOLPIDEM TARTRATE 5 MG/1
TABLET ORAL
Qty: 30 TABLET | Refills: 0 | Status: SHIPPED | OUTPATIENT
Start: 2020-09-17 | End: 2020-09-17

## 2020-09-17 RX ORDER — ZOLPIDEM TARTRATE 10 MG/1
10 TABLET ORAL NIGHTLY PRN
Qty: 60 TABLET | Refills: 1 | Status: SHIPPED | OUTPATIENT
Start: 2020-09-17 | End: 2021-06-22

## 2020-09-17 NOTE — TELEPHONE ENCOUNTER
Patient calling today to advise that [facility] will not refill her zolpidem 5 mg because the directions are to take 5mg (one tab nightly as needed for sleep).  Patient stated that she has been taking 2 pills nightly as the 5mg pill wasn't helping and that Dr. Chalino Jean-Baptiste

## 2020-10-05 ENCOUNTER — OFFICE VISIT (OUTPATIENT)
Dept: INTERNAL MEDICINE CLINIC | Facility: CLINIC | Age: 55
End: 2020-10-05

## 2020-10-05 VITALS
WEIGHT: 138 LBS | BODY MASS INDEX: 23.56 KG/M2 | SYSTOLIC BLOOD PRESSURE: 129 MMHG | HEIGHT: 64 IN | HEART RATE: 86 BPM | DIASTOLIC BLOOD PRESSURE: 86 MMHG

## 2020-10-05 DIAGNOSIS — F41.8 DEPRESSION WITH ANXIETY: ICD-10-CM

## 2020-10-05 DIAGNOSIS — E03.9 ACQUIRED HYPOTHYROIDISM: Primary | ICD-10-CM

## 2020-10-05 PROCEDURE — 99213 OFFICE O/P EST LOW 20 MIN: CPT | Performed by: INTERNAL MEDICINE

## 2020-10-05 PROCEDURE — 3079F DIAST BP 80-89 MM HG: CPT | Performed by: INTERNAL MEDICINE

## 2020-10-05 PROCEDURE — 3008F BODY MASS INDEX DOCD: CPT | Performed by: INTERNAL MEDICINE

## 2020-10-05 PROCEDURE — 3074F SYST BP LT 130 MM HG: CPT | Performed by: INTERNAL MEDICINE

## 2020-10-05 RX ORDER — HYDROXYZINE HYDROCHLORIDE 25 MG/1
TABLET, FILM COATED ORAL
COMMUNITY
Start: 2020-09-24 | End: 2021-06-22

## 2020-10-05 RX ORDER — DOCUSATE SODIUM 100 MG/1
100 CAPSULE, LIQUID FILLED ORAL 2 TIMES DAILY PRN
Qty: 60 CAPSULE | Refills: 1 | Status: SHIPPED | OUTPATIENT
Start: 2020-10-05

## 2020-10-05 NOTE — PROGRESS NOTES
HPI:    Patient ID: Anup Vela is a 63year old female. Patient presents with: Anxiety  Hot Flashes    Patient states she has very bad anxiety and difficulty sleeping  . patient states she saw another doctor who  Gave her sertraline 50 mg to sta needed for Sleep. 60 tablet 1   • busPIRone HCl 5 MG Oral Tab Take  1-2  Tab in AM and   2-3 tab at night for anxiety and sleep 90 tablet 0   • LEVOTHYROXINE SODIUM 150 MCG Oral Tab TAKE 1/2 TABLETS (75 MCG TOTAL) BY MOUTH BEFORE BREAKFAST.  45 tablet 1   • mood. She expresses no homicidal and no suicidal ideation. She expresses no suicidal plans and no homicidal plans. Sleeps 5-6 hr    Nursing note and vitals reviewed.     Blood pressure 129/86, pulse 86, height 5' 4\" (1.626 m), weight 138 lb (62.6 kg), & Referrals:  OP REFERRAL TO PSYCHIATRY  OP REFERRAL TO [facility] PAMELA P[facility]REZ#[id number]

## 2020-10-12 ENCOUNTER — HOSPITAL ENCOUNTER (EMERGENCY)
Facility: HOSPITAL | Age: 55
Discharge: HOME | End: 2020-10-12
Attending: EMERGENCY MEDICINE
Payer: OTHER GOVERNMENT

## 2020-10-12 ENCOUNTER — TELEPHONE (OUTPATIENT)
Dept: INTERNAL MEDICINE CLINIC | Facility: CLINIC | Age: 55
End: 2020-10-12

## 2020-10-12 VITALS
HEART RATE: 83 BPM | SYSTOLIC BLOOD PRESSURE: 130 MMHG | DIASTOLIC BLOOD PRESSURE: 75 MMHG | RESPIRATION RATE: 16 BRPM | OXYGEN SATURATION: 98 % | TEMPERATURE: 98 F

## 2020-10-12 DIAGNOSIS — F32.A DEPRESSION, UNSPECIFIED DEPRESSION TYPE: Primary | ICD-10-CM

## 2020-10-12 PROCEDURE — 84443 ASSAY THYROID STIM HORMONE: CPT | Performed by: EMERGENCY MEDICINE

## 2020-10-12 PROCEDURE — 99285 EMERGENCY DEPT VISIT HI MDM: CPT

## 2020-10-12 PROCEDURE — 85025 COMPLETE CBC W/AUTO DIFF WBC: CPT | Performed by: EMERGENCY MEDICINE

## 2020-10-12 PROCEDURE — 80048 BASIC METABOLIC PNL TOTAL CA: CPT | Performed by: EMERGENCY MEDICINE

## 2020-10-12 PROCEDURE — 93005 ELECTROCARDIOGRAM TRACING: CPT

## 2020-10-12 PROCEDURE — 93010 ELECTROCARDIOGRAM REPORT: CPT | Performed by: EMERGENCY MEDICINE

## 2020-10-12 PROCEDURE — 36415 COLL VENOUS BLD VENIPUNCTURE: CPT

## 2020-10-12 PROCEDURE — 80329 ANALGESICS NON-OPIOID 1 OR 2: CPT | Performed by: EMERGENCY MEDICINE

## 2020-10-12 PROCEDURE — 80307 DRUG TEST PRSMV CHEM ANLYZR: CPT | Performed by: EMERGENCY MEDICINE

## 2020-10-12 PROCEDURE — 80320 DRUG SCREEN QUANTALCOHOLS: CPT | Performed by: EMERGENCY MEDICINE

## 2020-10-12 NOTE — ED INITIAL ASSESSMENT (HPI)
Anxiety/depression and \"feeling helpless\"  Denies SI/HI.  Pt states \" I just need the right medication\"

## 2020-10-12 NOTE — ED PROVIDER NOTES
Patient without suicidal or homicidal ideations. Patient's worker came to evaluate the patient. Patient is appropriate for outpatient services for her psychiatric treatment of her depression. Patient safe to be discharged home with her .

## 2020-10-12 NOTE — TELEPHONE ENCOUNTER
Pt states she is taking Buspirone 5 mg for anxiety and sleep. Pt only took for a few days as it made \"me feel bad\", felt \"empty, very sad, nothing to look forward to, very bad\".   Pt also taking Zolpidem 10 mg for sleep, states she only sleeps 4-5 hours

## 2020-10-12 NOTE — ED PROVIDER NOTES
Patient Seen in: [facility] AND [facility] Emergency Department      History   Patient presents with:  José Miguel-RAFIA    Stated Complaint: Depressed     HPI  70-year-old female with hypothyroidism on Synthroid presenting for evaluation of depression.   She lost her job i SpO2 99 %   O2 Device None (Room air)       Current:/90   Pulse 86   Temp 98 °F (36.7 °C)   Resp 18   LMP 01/05/2016   SpO2 99%         Physical Exam  Vitals signs and nursing note reviewed.    Constitutional:       Appearance: She is well-developed DIFFERENTIAL[[id number]]          Abnormal            Final result                 Please view results for these tests on the individual orders. DRUG ABUSE PANEL 10 SCREEN     EKG    Rate, intervals and axes as noted on EKG Report.   Rate: 81  Rhythm: Sinu

## 2020-10-12 NOTE — ED NOTES
Pt gives verbal permission to provide Respite/Crisis Services with the faxed copy of the results of her covid test so she can participate in program.

## 2020-10-12 NOTE — BH LEVEL OF CARE ASSESSMENT
Level of Care Assessment Note    General Questions  Why are you here?: \"I just need the right medications. \"  Precipitating Events: Pt has been having trouble sleeping d/t panic since the end of August when she started medication for depression.  Endorses about wanting someone harmed or killed in the past 30 days?: No  Have you harmed someone or had thoughts about wanting someone harmed or killed further back than 30 days?: No  Current or Past Harm Toward Animals: No  History or Allegations of Inappropriate Questionnaire  Do you make yourself Sick because you feel uncomfortably full?: No  Do you worry that you have lost Control over how much you eat?: No  Have you recently lost more than One stone (14 lb) in a 3-month period?: No  Do you believe yourself to b to you that makes you feel unsafe?: No  Have You Ever Been Harmed by a Partner/Caregiver?: No  Health Concerns r/t Abuse: No  Possible Abuse Reportable to[de-identified] Not appropriate for reporting to authorities    Patient's legal sex: female  Patient identifies as: \"just wants medications that work. \" Pt denies HI, psychosis, cognitive impairment, substance use, SIB, ED. Risk/Protective Factors  Risk Factors: Environmental stress;Current/past psychiatric disorder;Stressful life events or loss; Change in treatment;

## 2020-10-13 NOTE — TELEPHONE ENCOUNTER
Noted  ,pt was seen in ER   . Patient was  Discharged and will follow up  In outpatient psychiatry services .

## 2020-10-14 ENCOUNTER — TELEPHONE (OUTPATIENT)
Dept: OTHER | Age: 55
End: 2020-10-14

## 2020-10-14 PROBLEM — G47.09 OTHER INSOMNIA: Status: ACTIVE | Noted: 2020-10-14

## 2020-10-14 NOTE — PROGRESS NOTES
HPI:     Depression  Visit Type: follow-up  Patient presents with the following symptoms: depressed mood, dry mouth, feelings of hopelessness, insomnia, irritability, nervousness/anxiety, palpitations, restlessness and shortness of breath.   Patient is not - bx negative   • Hemorrhage    • Hypothyroidism     drug therapy       Past Surgical History:     Past Surgical History:   Procedure Laterality Date   •   ,    • D & c     • Pterygium excision - od - right eye Right    • Pterygium exc abused: Not on file        Forced sexual activity: Not on file    Other Topics      Concerns:         Service: Not Asked        Blood Transfusions: Not Asked        Caffeine Concern: Yes          coffee, 2 cups/day        Occupational Exposure: Not External ear normal.   Left Ear: External ear normal.   Nose: Nose normal.   Eyes: Conjunctivae are normal. Right eye exhibits no discharge. Left eye exhibits no discharge.    Cardiovascular: Normal rate, regular rhythm, S1 normal, S2 normal and normal hear

## 2020-10-14 NOTE — TELEPHONE ENCOUNTER
Received call from the patient's , patient on speaker. He and the patient are very upset. They were arguing with each other and overall seemed very anxious during the call. He states that Naveen is very depressed and anxious. She is not sleeping and over taking sleeping medication. They went into the ER yesterday. They do not have insurance. They were referred to the [address] [place] [address] in [place]. They went there this morning. However, per their recollection they were turned away because her blood pressure was elevated at 127/96. Her  is very upset. He states he has recently been diagnosed with cancer and cannot be driving her to all of these places. He has his own health issues to address and is not receiving help from the rest of the family. I detect care giver fatigue. He was actively arguing with the patient and other family members while on the phone with me. Refusing to take to the ER again. He states they do not have insurance and cannot afford another visit there. Called Dr. BARRERA [facility] and described the situation. She is concerned because the patient has refused to seek psychiatric help in the past and has tried and failed several medications due to SE. Booked appt with Ruben at 1:30pm this afternoon and  BARRERA [facility] states she will assist [facility] as well. GIO I did call the [facility] center at [phone]. I was told that the patient was evaluated by their intake nurse. Per their documentation the patient was offered a respite stay for treatment/eval but she declined stating she only wanted to talk to a psychiatrist today. Also stated concerns about elevated BP and constipation. They left declining respite stay.

## 2020-10-15 NOTE — ED NOTES
Was told by AM psych a message was left requesting the pt's COVID results for the respite program. [facility] to inquire if they still need these results. They indicated they did not need them.

## 2020-10-15 NOTE — ASSESSMENT & PLAN NOTE
Start Trazodone 100 mg PO QHS. Patient has no health insurance at this time. Psychiatrist information given to patient for appointment ( no cost) at [facility].

## 2020-10-19 ENCOUNTER — TELEPHONE (OUTPATIENT)
Dept: INTERNAL MEDICINE CLINIC | Facility: CLINIC | Age: 55
End: 2020-10-19

## 2020-10-19 ENCOUNTER — OFFICE VISIT (OUTPATIENT)
Dept: FAMILY MEDICINE CLINIC | Facility: CLINIC | Age: 55
End: 2020-10-19

## 2020-10-19 ENCOUNTER — HOSPITAL ENCOUNTER (EMERGENCY)
Age: 55
Discharge: HOME | End: 2020-10-19
Attending: EMERGENCY MEDICINE

## 2020-10-19 VITALS
WEIGHT: 137.38 LBS | HEIGHT: 64 IN | DIASTOLIC BLOOD PRESSURE: 88 MMHG | BODY MASS INDEX: 23.45 KG/M2 | SYSTOLIC BLOOD PRESSURE: 136 MMHG | HEART RATE: 78 BPM | TEMPERATURE: 97 F

## 2020-10-19 VITALS
TEMPERATURE: 97.9 F | DIASTOLIC BLOOD PRESSURE: 95 MMHG | RESPIRATION RATE: 16 BRPM | OXYGEN SATURATION: 96 % | HEART RATE: 90 BPM | SYSTOLIC BLOOD PRESSURE: 146 MMHG

## 2020-10-19 DIAGNOSIS — F41.9 ANXIETY AND DEPRESSION: Primary | ICD-10-CM

## 2020-10-19 DIAGNOSIS — F32.A ANXIETY AND DEPRESSION: Primary | ICD-10-CM

## 2020-10-19 DIAGNOSIS — G47.9 SLEEP DISTURBANCE: Primary | ICD-10-CM

## 2020-10-19 DIAGNOSIS — F32.A DEPRESSION, UNSPECIFIED DEPRESSION TYPE: ICD-10-CM

## 2020-10-19 PROBLEM — M25.571 ACUTE RIGHT ANKLE PAIN: Status: RESOLVED | Noted: 2017-08-09 | Resolved: 2020-10-19

## 2020-10-19 LAB
ALBUMIN SERPL-MCNC: 3.8 G/DL (ref 3.6–5.1)
ALBUMIN/GLOB SERPL: 1 {RATIO} (ref 1–2.4)
ALP SERPL-CCNC: 78 UNITS/L (ref 45–117)
ALT SERPL-CCNC: 20 UNITS/L
AMPHETAMINES UR QL SCN>500 NG/ML: NEGATIVE
ANION GAP SERPL CALC-SCNC: 9 MMOL/L (ref 10–20)
AST SERPL-CCNC: 8 UNITS/L
BARBITURATES UR QL SCN>200 NG/ML: NEGATIVE
BASOPHILS # BLD: 0 K/MCL (ref 0–0.3)
BASOPHILS NFR BLD: 0 %
BENZODIAZ UR QL SCN>200 NG/ML: NEGATIVE
BILIRUB SERPL-MCNC: 0.2 MG/DL (ref 0.2–1)
BUN SERPL-MCNC: 8 MG/DL (ref 6–20)
BUN/CREAT SERPL: 13 (ref 7–25)
BZE UR QL SCN>150 NG/ML: NEGATIVE
CALCIUM SERPL-MCNC: 9.7 MG/DL (ref 8.4–10.2)
CANNABINOIDS UR QL SCN>50 NG/ML: NEGATIVE
CHLORIDE SERPL-SCNC: 107 MMOL/L (ref 98–107)
CO2 SERPL-SCNC: 26 MMOL/L (ref 21–32)
CREAT SERPL-MCNC: 0.6 MG/DL (ref 0.51–0.95)
DIFFERENTIAL METHOD BLD: ABNORMAL
EOSINOPHIL # BLD: 0.3 K/MCL (ref 0.1–0.5)
EOSINOPHIL NFR BLD: 3 %
ERYTHROCYTE [DISTWIDTH] IN BLOOD: 14.8 % (ref 11–15)
ETHANOL SERPL-MCNC: NORMAL MG/DL
GLOBULIN SER-MCNC: 3.8 G/DL (ref 2–4)
GLUCOSE SERPL-MCNC: 109 MG/DL (ref 65–99)
HCG UR QL: NEGATIVE
HCT VFR BLD CALC: 42.7 % (ref 36–46.5)
HGB BLD-MCNC: 13.6 G/DL (ref 12–15.5)
IMM GRANULOCYTES # BLD AUTO: 0 K/MCL (ref 0–0.2)
IMM GRANULOCYTES NFR BLD: 0 %
LYMPHOCYTES # BLD: 1.6 K/MCL (ref 1–4)
LYMPHOCYTES NFR BLD: 16 %
MCH RBC QN AUTO: 29.5 PG (ref 26–34)
MCHC RBC AUTO-ENTMCNC: 31.9 G/DL (ref 32–36.5)
MCV RBC AUTO: 92.6 FL (ref 78–100)
MONOCYTES # BLD: 0.4 K/MCL (ref 0.3–0.9)
MONOCYTES NFR BLD: 4 %
NEUTROPHILS # BLD: 7.8 K/MCL (ref 1.8–7.7)
NEUTROPHILS NFR BLD: 77 %
NRBC BLD MANUAL-RTO: 0 /100 WBC
OPIATES UR QL SCN>300 NG/ML: NEGATIVE
PCP UR QL SCN>25 NG/ML: NEGATIVE
PLATELET # BLD: 270 K/MCL (ref 140–450)
POTASSIUM SERPL-SCNC: 3.7 MMOL/L (ref 3.4–5.1)
PROT SERPL-MCNC: 7.6 G/DL (ref 6.4–8.2)
RBC # BLD: 4.61 MIL/MCL (ref 4–5.2)
SODIUM SERPL-SCNC: 138 MMOL/L (ref 135–145)
WBC # BLD: 10.1 K/MCL (ref 4.2–11)

## 2020-10-19 PROCEDURE — 3079F DIAST BP 80-89 MM HG: CPT | Performed by: FAMILY MEDICINE

## 2020-10-19 PROCEDURE — 80307 DRUG TEST PRSMV CHEM ANLYZR: CPT

## 2020-10-19 PROCEDURE — 85025 COMPLETE CBC W/AUTO DIFF WBC: CPT

## 2020-10-19 PROCEDURE — 84703 CHORIONIC GONADOTROPIN ASSAY: CPT

## 2020-10-19 PROCEDURE — 10002803 HB RX 637: Performed by: EMERGENCY MEDICINE

## 2020-10-19 PROCEDURE — 80053 COMPREHEN METABOLIC PANEL: CPT

## 2020-10-19 PROCEDURE — 80320 DRUG SCREEN QUANTALCOHOLS: CPT

## 2020-10-19 PROCEDURE — 36415 COLL VENOUS BLD VENIPUNCTURE: CPT

## 2020-10-19 PROCEDURE — 99284 EMERGENCY DEPT VISIT MOD MDM: CPT

## 2020-10-19 PROCEDURE — 3008F BODY MASS INDEX DOCD: CPT | Performed by: FAMILY MEDICINE

## 2020-10-19 PROCEDURE — 99284 EMERGENCY DEPT VISIT MOD MDM: CPT | Performed by: EMERGENCY MEDICINE

## 2020-10-19 PROCEDURE — 99214 OFFICE O/P EST MOD 30 MIN: CPT | Performed by: FAMILY MEDICINE

## 2020-10-19 PROCEDURE — 3075F SYST BP GE 130 - 139MM HG: CPT | Performed by: FAMILY MEDICINE

## 2020-10-19 RX ORDER — LORAZEPAM 1 MG/1
1 TABLET ORAL ONCE
Status: COMPLETED | OUTPATIENT
Start: 2020-10-19 | End: 2020-10-19

## 2020-10-19 RX ORDER — CLONAZEPAM 1 MG/1
1 TABLET ORAL NIGHTLY PRN
Qty: 30 TABLET | Refills: 0 | Status: SHIPPED | OUTPATIENT
Start: 2020-10-19 | End: 2020-10-26

## 2020-10-19 RX ADMIN — LORAZEPAM 1 MG: 1 TABLET ORAL at 14:47

## 2020-10-19 RX ADMIN — LORAZEPAM 1 MG: 1 TABLET ORAL at 16:15

## 2020-10-19 ASSESSMENT — ENCOUNTER SYMPTOMS
NAUSEA: 0
SORE THROAT: 0
SLEEP DISTURBANCE: 1
FEVER: 0
CONFUSION: 0
DIZZINESS: 0
WEAKNESS: 0
VOMITING: 0
CHILLS: 0
TROUBLE SWALLOWING: 0
CHEST TIGHTNESS: 0
HEADACHES: 0
EYE PAIN: 0
ABDOMINAL PAIN: 0
NERVOUS/ANXIOUS: 1
POLYDIPSIA: 0
SHORTNESS OF BREATH: 0
SPEECH DIFFICULTY: 0

## 2020-10-19 ASSESSMENT — PAIN SCALES - GENERAL: PAINLEVEL_OUTOF10: 0

## 2020-10-19 NOTE — TELEPHONE ENCOUNTER
Patient stated that has been taking the zolpidem to help her sleep but feels like it is causing her to have anxiety. First thing in the morning feels like she is having an anxiety attack. Has not stopped the medication because needs it to sleep.  Denies any

## 2020-10-19 NOTE — TELEPHONE ENCOUNTER
Patient's  called. Patient was recently in the ER , and then from there was supposed to be admitted to  A psych. center but her bp was too high there. Patient was started on Trazadone and [place] . She has felt worse on these meds.  She is pacing al

## 2020-10-19 NOTE — PROGRESS NOTES
HPI:   Joel Enriquez is a 63year old female who presents for follow up for anxiety and sleep disturbance. Has been treated with Ambien in the past but states that it is not helping, will sleep less than 6 hours a night.   Has also recently been pre drug therapy      Past Surgical History:   Procedure Laterality Date   •   1999   • D & C     • PTERYGIUM EXCISION - OD - RIGHT EYE Right    • PTERYGIUM EXCISION - OS - LEFT EYE Left       Family History   Problem Relation Age of Onset sleep an hour before bedtime. Discussed sleep hygiene.  -Needs psychiatry about options limited as she does not have insurance. 2. Depression, unspecified depression type  - Sertraline HCl 50 MG Oral Tab; Take daily in morning  Dispense: 60 tablet;  Ref

## 2020-10-19 NOTE — TELEPHONE ENCOUNTER
I agree with the plan patient to go to ER or go to the crisis center [phone]

## 2020-10-23 ENCOUNTER — NURSE TRIAGE (OUTPATIENT)
Dept: INTERNAL MEDICINE CLINIC | Facility: CLINIC | Age: 55
End: 2020-10-23

## 2020-10-23 DIAGNOSIS — G47.9 SLEEP DISTURBANCE: ICD-10-CM

## 2020-10-23 NOTE — TELEPHONE ENCOUNTER
Patient seen few other doctors after  Myself ,so I am not sure what medicine is she currently taking      I Recommend to family if patient is not eating or drinking well  ,she might be dehydrated to take her to emergency room for fluids and evaluation on e

## 2020-10-23 NOTE — TELEPHONE ENCOUNTER
Please reply to pool: EM RN TRIAGE  Action Requested: Summary for Provider     []  Critical Lab, Recommendations Needed  [] Need Additional Advice  [x]   FYI    []   Need Orders  [] Need Medications Sent to Pharmacy  []  Other     SUMMARY: Abi Del Rio

## 2020-10-23 NOTE — TELEPHONE ENCOUNTER
Spoke to son Declan Damon on Skyla Snow Dr. Keith Gustafson message below  Stressed the importance of getting his mom to the ER as she has not been drinking or eating properly  He mentioned that he believes his mother's medication may be having an effect on her me

## 2020-10-23 NOTE — TELEPHONE ENCOUNTER
Son calling stating his mom has been acting \"different\" for the past two weeks and believes this is due to her medication. Son not currently with patient, but states his sister is.     Spoke with patient's daughter Sara Flynn who states patient has been i

## 2020-10-26 RX ORDER — CLONAZEPAM 1 MG/1
1 TABLET ORAL NIGHTLY PRN
Qty: 30 TABLET | Refills: 0 | Status: SHIPPED | OUTPATIENT
Start: 2020-10-26

## 2020-10-26 NOTE — TELEPHONE ENCOUNTER
Verified pt name and . Reviewed doctor's recommendations as noted below. Pt agreed with plan of care, states she is not having any thoughts of suicide at this time and will call [facility] crisis center hotline if needed.

## 2020-10-26 NOTE — TELEPHONE ENCOUNTER
No MyChart. Tried calling 's mobile but voice mail is full. Tried calling home number and female voice picked up the phone, states that she is the patient, verified name and ,  Patient denies any suicidal thought or plan.   States that she is fe

## 2020-10-26 NOTE — TELEPHONE ENCOUNTER
Klonopin sent to pharmacy. I will not be resending this medication if she loses it again. Please reiterate to patient/family member that she should only be taking the sertraline during the daytime and the Klonopin for sleep.     It may be helpful if they

## 2020-10-27 ENCOUNTER — TELEPHONE (OUTPATIENT)
Dept: FAMILY MEDICINE CLINIC | Facility: CLINIC | Age: 55
End: 2020-10-27

## 2020-10-27 NOTE — TELEPHONE ENCOUNTER
Spoke with Dutch at [address] [facility] and verified ok to early dispense per Dr. Lynwood Harada below--he verbalizes understanding and agreement. No further questions/concerns at this time.     Order History  Outpatient  Date/Time Action Taken User Additional In[employer]

## 2020-12-18 RX ORDER — LEVOTHYROXINE SODIUM 0.15 MG/1
TABLET ORAL
Qty: 45 TABLET | Refills: 1 | Status: SHIPPED | OUTPATIENT
Start: 2020-12-18 | End: 2021-09-02

## 2021-01-10 DIAGNOSIS — F32.A DEPRESSION, UNSPECIFIED DEPRESSION TYPE: ICD-10-CM

## 2021-01-29 DIAGNOSIS — G47.9 SLEEP DISTURBANCE: ICD-10-CM

## 2021-01-30 RX ORDER — CLONAZEPAM 1 MG/1
TABLET ORAL
Qty: 30 TABLET | Refills: 0 | OUTPATIENT
Start: 2021-01-30

## 2021-06-18 ENCOUNTER — TELEPHONE (OUTPATIENT)
Dept: INTERNAL MEDICINE CLINIC | Facility: CLINIC | Age: 56
End: 2021-06-18

## 2021-06-18 NOTE — TELEPHONE ENCOUNTER
Pt states has suffered from insomnia for a year. Pt states she is uncertain if it is related to anxiety. Pt would like to schedule with Dr Annia Fonseca.     OV scheduled 6/22/21 with Dr Annia Fonseca for further eval

## 2021-06-22 ENCOUNTER — OFFICE VISIT (OUTPATIENT)
Dept: FAMILY MEDICINE CLINIC | Facility: CLINIC | Age: 56
End: 2021-06-22

## 2021-06-22 VITALS
TEMPERATURE: 98 F | HEART RATE: 67 BPM | SYSTOLIC BLOOD PRESSURE: 119 MMHG | WEIGHT: 144 LBS | HEIGHT: 64 IN | DIASTOLIC BLOOD PRESSURE: 73 MMHG | BODY MASS INDEX: 24.59 KG/M2

## 2021-06-22 DIAGNOSIS — G47.9 SLEEP DISTURBANCE: Primary | ICD-10-CM

## 2021-06-22 DIAGNOSIS — E55.9 VITAMIN D DEFICIENCY: ICD-10-CM

## 2021-06-22 PROCEDURE — 99214 OFFICE O/P EST MOD 30 MIN: CPT | Performed by: FAMILY MEDICINE

## 2021-06-22 PROCEDURE — 3078F DIAST BP <80 MM HG: CPT | Performed by: FAMILY MEDICINE

## 2021-06-22 PROCEDURE — 3008F BODY MASS INDEX DOCD: CPT | Performed by: FAMILY MEDICINE

## 2021-06-22 PROCEDURE — 3074F SYST BP LT 130 MM HG: CPT | Performed by: FAMILY MEDICINE

## 2021-06-22 RX ORDER — ERGOCALCIFEROL 1.25 MG/1
50000 CAPSULE ORAL WEEKLY
Qty: 4 CAPSULE | Refills: 0 | Status: SHIPPED | OUTPATIENT
Start: 2021-06-22 | End: 2021-07-13

## 2021-06-22 RX ORDER — CLONAZEPAM 1 MG/1
1 TABLET ORAL NIGHTLY PRN
Qty: 30 TABLET | Refills: 0 | Status: SHIPPED | OUTPATIENT
Start: 2021-06-22 | End: 2021-07-30

## 2021-06-22 RX ORDER — CLONAZEPAM 1 MG/1
1 TABLET ORAL NIGHTLY PRN
Qty: 30 TABLET | Refills: 0 | Status: SHIPPED | OUTPATIENT
Start: 2021-07-22 | End: 2021-07-30

## 2021-06-22 RX ORDER — CLONAZEPAM 1 MG/1
1 TABLET ORAL 2 TIMES DAILY PRN
Qty: 30 TABLET | Refills: 0 | Status: SHIPPED | OUTPATIENT
Start: 2021-08-22

## 2021-06-22 NOTE — PROGRESS NOTES
Patient presents with:  Sleep Problem  Anxiety    HPI:   Natan Jones is a 61year old female who presents to clinic with complaints of sleep disturbance and anxiety.   Patient has previously been given prescriptions for sertraline, hydroxyzine, Amb clonazePAM 1 MG Oral Tab; Take 1 tablet (1 mg total) by mouth 2 (two) times daily as needed for Anxiety. Dispense: 30 tablet; Refill: 0    2. Vitamin D deficiency  - ergocalciferol 1.25 MG (26521 UT) Oral Cap;  Take 1 capsule (50,000 Units total) by mouth

## 2021-07-12 DIAGNOSIS — E55.9 VITAMIN D DEFICIENCY: ICD-10-CM

## 2021-07-13 RX ORDER — ERGOCALCIFEROL 1.25 MG/1
CAPSULE ORAL
Qty: 4 CAPSULE | Refills: 0 | Status: SHIPPED | OUTPATIENT
Start: 2021-07-13 | End: 2021-08-30

## 2021-07-29 DIAGNOSIS — G47.9 SLEEP DISTURBANCE: ICD-10-CM

## 2021-07-30 RX ORDER — CLONAZEPAM 1 MG/1
TABLET ORAL
Qty: 30 TABLET | Refills: 0 | Status: SHIPPED | OUTPATIENT
Start: 2021-07-30 | End: 2021-10-07

## 2021-08-30 DIAGNOSIS — E55.9 VITAMIN D DEFICIENCY: ICD-10-CM

## 2021-08-30 RX ORDER — ERGOCALCIFEROL 1.25 MG/1
50000 CAPSULE ORAL WEEKLY
Qty: 4 CAPSULE | Refills: 0 | Status: SHIPPED | OUTPATIENT
Start: 2021-08-30 | End: 2021-10-02

## 2021-08-30 NOTE — TELEPHONE ENCOUNTER
Please review; protocol failed.     Requested Prescriptions   Pending Prescriptions Disp Refills    ERGOCALCIFEROL 1.25 MG (43018 UT) Oral Cap [Pharmacy Med Name: VITAMIN D2 1.25MG(50,000 UNIT)] 4 capsule 0     Sig: TOME 1 CAPSULA POR VIA ORAL ONE TIME PER

## 2021-09-02 RX ORDER — LEVOTHYROXINE SODIUM 0.15 MG/1
TABLET ORAL
Qty: 45 TABLET | Refills: 1 | Status: SHIPPED | OUTPATIENT
Start: 2021-09-02 | End: 2022-04-27

## 2021-09-29 ENCOUNTER — OFFICE VISIT (OUTPATIENT)
Dept: OBGYN | Age: 56
End: 2021-09-29

## 2021-09-29 VITALS
HEIGHT: 64 IN | DIASTOLIC BLOOD PRESSURE: 70 MMHG | HEART RATE: 77 BPM | SYSTOLIC BLOOD PRESSURE: 108 MMHG | BODY MASS INDEX: 23.96 KG/M2 | WEIGHT: 140.32 LBS

## 2021-09-29 DIAGNOSIS — R92.30 DENSE BREAST: ICD-10-CM

## 2021-09-29 DIAGNOSIS — Z01.419 GYNECOLOGIC EXAM NORMAL: Primary | ICD-10-CM

## 2021-09-29 DIAGNOSIS — Z12.31 ENCOUNTER FOR SCREENING MAMMOGRAM FOR MALIGNANT NEOPLASM OF BREAST: ICD-10-CM

## 2021-09-29 DIAGNOSIS — Z01.419 ENCOUNTER FOR ROUTINE GYNECOLOGICAL EXAMINATION WITH PAPANICOLAOU SMEAR OF CERVIX: ICD-10-CM

## 2021-09-29 DIAGNOSIS — R92.2 DENSE BREAST: ICD-10-CM

## 2021-09-29 DIAGNOSIS — Z11.51 SCREENING FOR HUMAN PAPILLOMAVIRUS (HPV): ICD-10-CM

## 2021-09-29 PROBLEM — F41.9 ANXIETY: Status: ACTIVE | Noted: 2021-09-29

## 2021-09-29 PROBLEM — E03.8 OTHER SPECIFIED HYPOTHYROIDISM: Status: ACTIVE | Noted: 2021-09-29

## 2021-09-29 PROBLEM — F32.A DEPRESSION: Status: ACTIVE | Noted: 2021-09-29

## 2021-09-29 PROBLEM — Z28.21 INFLUENZA VACCINE REFUSED: Status: ACTIVE | Noted: 2021-09-29

## 2021-09-29 PROCEDURE — 88175 CYTOPATH C/V AUTO FLUID REDO: CPT | Performed by: CLINICAL MEDICAL LABORATORY

## 2021-09-29 PROCEDURE — 87624 HPV HI-RISK TYP POOLED RSLT: CPT | Performed by: CLINICAL MEDICAL LABORATORY

## 2021-09-29 PROCEDURE — 99386 PREV VISIT NEW AGE 40-64: CPT | Performed by: OBSTETRICS & GYNECOLOGY

## 2021-09-29 RX ORDER — LEVOTHYROXINE SODIUM 0.15 MG/1
TABLET ORAL
COMMUNITY
Start: 2021-09-02

## 2021-09-29 RX ORDER — ERGOCALCIFEROL 1.25 MG/1
CAPSULE ORAL
COMMUNITY
Start: 2021-08-31

## 2021-09-29 ASSESSMENT — PATIENT HEALTH QUESTIONNAIRE - PHQ9
SUM OF ALL RESPONSES TO PHQ9 QUESTIONS 1 AND 2: 0
1. LITTLE INTEREST OR PLEASURE IN DOING THINGS: NOT AT ALL
SUM OF ALL RESPONSES TO PHQ9 QUESTIONS 1 AND 2: 0
CLINICAL INTERPRETATION OF PHQ2 SCORE: NO FURTHER SCREENING NEEDED
CLINICAL INTERPRETATION OF PHQ9 SCORE: NO FURTHER SCREENING NEEDED
2. FEELING DOWN, DEPRESSED OR HOPELESS: NOT AT ALL

## 2021-10-01 ENCOUNTER — TELEPHONE (OUTPATIENT)
Dept: OBGYN | Age: 56
End: 2021-10-01

## 2021-10-01 DIAGNOSIS — E55.9 VITAMIN D DEFICIENCY: ICD-10-CM

## 2021-10-02 RX ORDER — ERGOCALCIFEROL 1.25 MG/1
CAPSULE ORAL
Qty: 4 CAPSULE | Refills: 0 | Status: SHIPPED | OUTPATIENT
Start: 2021-10-02 | End: 2021-10-29

## 2021-10-04 LAB
CASE RPRT: NORMAL
CYTOLOGY CVX/VAG STUDY: NORMAL
HPV16+18+45 E6+E7MRNA CVX NAA+PROBE: NEGATIVE
Lab: NORMAL
PAP EDUCATIONAL NOTE: NORMAL
SPECIMEN ADEQUACY: NORMAL

## 2021-10-05 DIAGNOSIS — G47.9 SLEEP DISTURBANCE: ICD-10-CM

## 2021-10-06 NOTE — TELEPHONE ENCOUNTER
Protocol failed or has No Protocol, please review  Requested Prescriptions   Pending Prescriptions Disp Refills    CLONAZEPAM 1 MG Oral Tab [Pharmacy Med Name: CLONAZEPAM 1 MG TABLET] 30 tablet 0     Sig: TOME GIOVANNY TABLETA POR VIA ORAL NIGHTLY AS NEEDED FOR ANXIETY        There is no refill protocol information for this order           Recent Outpatient Visits              3 months ago Sleep disturbance    Behzad Childers MD    Office Visit    11 months ago Sleep disturbance    Jean Pierre Singh MD    Office Visit    11 months ago Depression with anxiety    [address] Lori Diallo PA-C    Office Visit    1 year ago Acquired hypothyroidism    Chrissy Ritchie MD    Office Visit    1 year ago Cindy Lesches, [place] Mould, MD    Office Visit

## 2021-10-07 RX ORDER — CLONAZEPAM 1 MG/1
1 TABLET ORAL NIGHTLY PRN
Qty: 30 TABLET | Refills: 0 | Status: SHIPPED | OUTPATIENT
Start: 2021-10-07

## 2021-10-28 DIAGNOSIS — E55.9 VITAMIN D DEFICIENCY: ICD-10-CM

## 2021-10-29 RX ORDER — ERGOCALCIFEROL 1.25 MG/1
CAPSULE ORAL
Qty: 4 CAPSULE | Refills: 0 | Status: SHIPPED | OUTPATIENT
Start: 2021-10-29

## 2021-11-23 ENCOUNTER — LAB SERVICES (OUTPATIENT)
Dept: LAB | Age: 56
End: 2021-11-23

## 2021-11-23 ENCOUNTER — LAB REQUISITION (OUTPATIENT)
Dept: LAB | Age: 56
End: 2021-11-23

## 2021-11-23 DIAGNOSIS — E55.9 VITAMIN D DEFICIENCY, UNSPECIFIED: ICD-10-CM

## 2021-11-23 DIAGNOSIS — Z00.00 ENCOUNTER FOR GENERAL ADULT MEDICAL EXAMINATION WITHOUT ABNORMAL FINDINGS: ICD-10-CM

## 2021-11-23 DIAGNOSIS — Z12.11 ENCOUNTER FOR SCREENING FOR MALIGNANT NEOPLASM OF COLON: ICD-10-CM

## 2021-11-23 LAB
25(OH)D3+25(OH)D2 SERPL-MCNC: 47.2 NG/ML (ref 30–100)
ALBUMIN SERPL-MCNC: 4 G/DL (ref 3.6–5.1)
ALBUMIN/GLOB SERPL: 1.1 {RATIO} (ref 1–2.4)
ALP SERPL-CCNC: 95 UNITS/L (ref 45–117)
ALT SERPL-CCNC: 27 UNITS/L
ANION GAP SERPL CALC-SCNC: 9 MMOL/L (ref 10–20)
AST SERPL-CCNC: 16 UNITS/L
BASOPHILS # BLD: 0.1 K/MCL (ref 0–0.3)
BASOPHILS NFR BLD: 1 %
BILIRUB SERPL-MCNC: 0.4 MG/DL (ref 0.2–1)
BUN SERPL-MCNC: 17 MG/DL (ref 6–20)
BUN/CREAT SERPL: 30 (ref 7–25)
CALCIUM SERPL-MCNC: 9.2 MG/DL (ref 8.4–10.2)
CHLORIDE SERPL-SCNC: 108 MMOL/L (ref 98–107)
CHOLEST SERPL-MCNC: 188 MG/DL
CHOLEST/HDLC SERPL: 2.9 {RATIO}
CO2 SERPL-SCNC: 29 MMOL/L (ref 21–32)
CREAT SERPL-MCNC: 0.56 MG/DL (ref 0.51–0.95)
DEPRECATED RDW RBC: 48.5 FL (ref 39–50)
EOSINOPHIL # BLD: 0.8 K/MCL (ref 0–0.5)
EOSINOPHIL NFR BLD: 10 %
ERYTHROCYTE [DISTWIDTH] IN BLOOD: 14.4 % (ref 11–15)
FASTING DURATION TIME PATIENT: ABNORMAL H
FASTING DURATION TIME PATIENT: NORMAL H
GFR SERPLBLD BASED ON 1.73 SQ M-ARVRAT: >90 ML/MIN
GLOBULIN SER-MCNC: 3.5 G/DL (ref 2–4)
GLUCOSE SERPL-MCNC: 83 MG/DL (ref 70–99)
HCT VFR BLD CALC: 41.1 % (ref 36–46.5)
HDLC SERPL-MCNC: 64 MG/DL
HGB BLD-MCNC: 13.2 G/DL (ref 12–15.5)
IMM GRANULOCYTES # BLD AUTO: 0 K/MCL (ref 0–0.2)
IMM GRANULOCYTES # BLD: 0 %
LDLC SERPL CALC-MCNC: 109 MG/DL
LYMPHOCYTES # BLD: 2 K/MCL (ref 1–4)
LYMPHOCYTES NFR BLD: 27 %
MCH RBC QN AUTO: 29.5 PG (ref 26–34)
MCHC RBC AUTO-ENTMCNC: 32.1 G/DL (ref 32–36.5)
MCV RBC AUTO: 91.7 FL (ref 78–100)
MONOCYTES # BLD: 0.4 K/MCL (ref 0.3–0.9)
MONOCYTES NFR BLD: 5 %
NEUTROPHILS # BLD: 4.2 K/MCL (ref 1.8–7.7)
NEUTROPHILS NFR BLD: 57 %
NONHDLC SERPL-MCNC: 124 MG/DL
NRBC BLD MANUAL-RTO: 0 /100 WBC
PLATELET # BLD AUTO: 264 K/MCL (ref 140–450)
POTASSIUM SERPL-SCNC: 5 MMOL/L (ref 3.4–5.1)
PROT SERPL-MCNC: 7.5 G/DL (ref 6.4–8.2)
RBC # BLD: 4.48 MIL/MCL (ref 4–5.2)
SODIUM SERPL-SCNC: 141 MMOL/L (ref 135–145)
TRIGL SERPL-MCNC: 75 MG/DL
TSH SERPL-ACNC: 1.14 MCUNITS/ML (ref 0.35–5)
WBC # BLD: 7.3 K/MCL (ref 4.2–11)

## 2021-11-23 PROCEDURE — 82306 VITAMIN D 25 HYDROXY: CPT | Performed by: CLINICAL MEDICAL LABORATORY

## 2021-11-23 PROCEDURE — 85025 COMPLETE CBC W/AUTO DIFF WBC: CPT | Performed by: CLINICAL MEDICAL LABORATORY

## 2021-11-23 PROCEDURE — 80053 COMPREHEN METABOLIC PANEL: CPT | Performed by: CLINICAL MEDICAL LABORATORY

## 2021-11-23 PROCEDURE — 80061 LIPID PANEL: CPT | Performed by: CLINICAL MEDICAL LABORATORY

## 2021-11-23 PROCEDURE — 84443 ASSAY THYROID STIM HORMONE: CPT | Performed by: CLINICAL MEDICAL LABORATORY

## 2022-04-28 NOTE — TELEPHONE ENCOUNTER
Please review; protocol failed/no protocol. Requested Prescriptions   Pending Prescriptions Disp Refills    LEVOTHYROXINE 150 MCG Oral Tab [Pharmacy Med Name: LEVOTHYROXINE 150 MCG TABLET] 45 tablet 1     Sig: TAKE 1/2 TABLETS (75 MCG TOTAL) BY MOUTH BEFORE BREAKFAST.         Thyroid Medication Protocol Failed - 4/27/2022  8:09 PM        Failed - TSH in past 12 months        Failed - Appointment in past 12 or next 3 months        Passed - Last TSH value is normal     Lab Results   Component Value Date    TSH 1.650 10/12/2020                        Recent Outpatient Visits              10 months ago Sleep disturbance    [address] MD    Office Visit    1 year ago Sleep disturbance    [address] MD    Office Visit    1 year ago Depression with anxiety    [address] Paco Gant PA-C    Office Visit    1 year ago Acquired hypothyroidism    Chasidy De Jesus MD    Office Visit    1 year ago Deneen Murders, [place] Lindalou Given, MD    Office Visit

## 2022-04-29 RX ORDER — LEVOTHYROXINE SODIUM 0.15 MG/1
TABLET ORAL
Qty: 30 TABLET | Refills: 0 | Status: SHIPPED | OUTPATIENT
Start: 2022-04-29

## 2022-04-29 NOTE — TELEPHONE ENCOUNTER
Call center please call and schedule an appointment. Thank You.       Criss Early MD  Em Rn Triage 1 hour ago (1:39 PM)         Patient needs labs done as soon as possible and follow-up visit please schedule-    Routing comment

## 2022-05-24 RX ORDER — LEVOTHYROXINE SODIUM 0.15 MG/1
TABLET ORAL
Qty: 30 TABLET | Refills: 0 | OUTPATIENT
Start: 2022-05-24

## 2022-05-24 NOTE — TELEPHONE ENCOUNTER
CSS- please try to reach out to patient again for appointment. She needs labs too. No Mychart. Thank you.

## 2022-07-07 ENCOUNTER — LAB REQUISITION (OUTPATIENT)
Dept: LAB | Age: 57
End: 2022-07-07

## 2022-07-07 ENCOUNTER — LAB SERVICES (OUTPATIENT)
Dept: LAB | Age: 57
End: 2022-07-07

## 2022-07-07 DIAGNOSIS — E03.9 HYPOTHYROIDISM, UNSPECIFIED: ICD-10-CM

## 2022-07-07 DIAGNOSIS — R25.2 CRAMP AND SPASM: ICD-10-CM

## 2022-07-07 LAB
MAGNESIUM SERPL-MCNC: 2.6 MG/DL (ref 1.7–2.4)
TSH SERPL-ACNC: 2.65 MCUNITS/ML (ref 0.35–5)

## 2022-07-07 PROCEDURE — 36415 COLL VENOUS BLD VENIPUNCTURE: CPT | Performed by: CLINICAL MEDICAL LABORATORY

## 2022-07-07 PROCEDURE — 84443 ASSAY THYROID STIM HORMONE: CPT | Performed by: CLINICAL MEDICAL LABORATORY

## 2022-07-07 PROCEDURE — 83735 ASSAY OF MAGNESIUM: CPT | Performed by: CLINICAL MEDICAL LABORATORY

## 2022-07-22 RX ORDER — LEVOTHYROXINE SODIUM 0.15 MG/1
TABLET ORAL
Qty: 30 TABLET | Refills: 0 | OUTPATIENT
Start: 2022-07-22

## 2022-11-07 ENCOUNTER — HOSPITAL ENCOUNTER (OUTPATIENT)
Age: 57
Discharge: HOME | End: 2022-11-07
Payer: COMMERCIAL

## 2022-11-07 VITALS
TEMPERATURE: 98 F | DIASTOLIC BLOOD PRESSURE: 79 MMHG | SYSTOLIC BLOOD PRESSURE: 107 MMHG | OXYGEN SATURATION: 97 % | HEART RATE: 91 BPM | RESPIRATION RATE: 18 BRPM | WEIGHT: 150 LBS | BODY MASS INDEX: 25.61 KG/M2 | HEIGHT: 64 IN

## 2022-11-07 DIAGNOSIS — J11.1 INFLUENZA: Primary | ICD-10-CM

## 2022-11-07 DIAGNOSIS — R05.9 COUGH: ICD-10-CM

## 2022-11-07 LAB
POCT INFLUENZA A: POSITIVE
POCT INFLUENZA B: NEGATIVE

## 2022-11-07 PROCEDURE — 87502 INFLUENZA DNA AMP PROBE: CPT

## 2022-11-07 PROCEDURE — 99213 OFFICE O/P EST LOW 20 MIN: CPT

## 2022-11-07 RX ORDER — BENZONATATE 100 MG/1
100 CAPSULE ORAL 3 TIMES DAILY PRN
Qty: 30 CAPSULE | Refills: 0 | Status: SHIPPED | OUTPATIENT
Start: 2022-11-07 | End: 2022-12-07

## 2022-11-07 NOTE — DISCHARGE INSTRUCTIONS
Influenza A test is positive. There are no signs of infection on physical exam.  This is likely a viral illness. Please be sure to drink plenty of fluids, use Tylenol and Motrin for pain or fever. Use Flonase and Mucinex for congestion. If you develop any respiratory complaints, fever that does not improve with medications or any other concerning complaints you should go to the emergency department. Otherwise follow up with your primary care provider.

## 2023-02-12 ENCOUNTER — HOSPITAL ENCOUNTER (OUTPATIENT)
Age: 58
Discharge: HOME | End: 2023-02-12
Payer: COMMERCIAL

## 2023-02-12 VITALS
OXYGEN SATURATION: 99 % | HEART RATE: 81 BPM | RESPIRATION RATE: 20 BRPM | TEMPERATURE: 98 F | DIASTOLIC BLOOD PRESSURE: 79 MMHG | SYSTOLIC BLOOD PRESSURE: 137 MMHG

## 2023-02-12 DIAGNOSIS — K64.9 HEMORRHOIDS, UNSPECIFIED HEMORRHOID TYPE: Primary | ICD-10-CM

## 2023-02-12 RX ORDER — HYDROCORTISONE ACETATE 25 MG/1
25 SUPPOSITORY RECTAL 2 TIMES DAILY PRN
Qty: 12 SUPPOSITORY | Refills: 0 | Status: SHIPPED | OUTPATIENT
Start: 2023-02-12 | End: 2023-02-26

## 2023-02-12 NOTE — ED INITIAL ASSESSMENT (HPI)
Pt c/o anal itching and discomfort x 2-3 weeks. States the food served at work might be causing this issue.  Pt denies diarrhea

## 2023-02-12 NOTE — DISCHARGE INSTRUCTIONS
Sitz bath as directed. High-fiber diet. Use the Anusol suppositories as directed. Follow-up with your primary care provider or GI as provided. Return to the emergency room for any new or worsening symptoms, abdominal pain, vomiting, rectal bleeding or fever.

## 2023-03-21 ENCOUNTER — LAB REQUISITION (OUTPATIENT)
Dept: LAB | Age: 58
End: 2023-03-21

## 2023-03-21 DIAGNOSIS — Z00.00 ENCOUNTER FOR GENERAL ADULT MEDICAL EXAMINATION WITHOUT ABNORMAL FINDINGS: ICD-10-CM

## 2023-03-21 DIAGNOSIS — E55.9 VITAMIN D DEFICIENCY, UNSPECIFIED: ICD-10-CM

## 2023-03-21 DIAGNOSIS — Z12.11 ENCOUNTER FOR SCREENING FOR MALIGNANT NEOPLASM OF COLON: ICD-10-CM

## 2023-03-28 ENCOUNTER — LAB SERVICES (OUTPATIENT)
Dept: LAB | Age: 58
End: 2023-03-28

## 2023-03-28 LAB
25(OH)D3+25(OH)D2 SERPL-MCNC: 24.3 NG/ML (ref 30–100)
ALBUMIN SERPL-MCNC: 3.7 G/DL (ref 3.6–5.1)
ALBUMIN/GLOB SERPL: 1.2 {RATIO} (ref 1–2.4)
ALP SERPL-CCNC: 81 UNITS/L (ref 45–117)
ALT SERPL-CCNC: 22 UNITS/L
ANION GAP SERPL CALC-SCNC: 5 MMOL/L (ref 7–19)
AST SERPL-CCNC: 13 UNITS/L
BASOPHILS # BLD: 0.1 K/MCL (ref 0–0.3)
BASOPHILS NFR BLD: 1 %
BILIRUB SERPL-MCNC: 0.4 MG/DL (ref 0.2–1)
BUN SERPL-MCNC: 15 MG/DL (ref 6–20)
BUN/CREAT SERPL: 26 (ref 7–25)
CALCIUM SERPL-MCNC: 8.7 MG/DL (ref 8.4–10.2)
CHLORIDE SERPL-SCNC: 112 MMOL/L (ref 97–110)
CHOLEST SERPL-MCNC: 188 MG/DL
CHOLEST/HDLC SERPL: 3 {RATIO}
CO2 SERPL-SCNC: 27 MMOL/L (ref 21–32)
CREAT SERPL-MCNC: 0.58 MG/DL (ref 0.51–0.95)
DEPRECATED RDW RBC: 49.2 FL (ref 39–50)
EOSINOPHIL # BLD: 1 K/MCL (ref 0–0.5)
EOSINOPHIL NFR BLD: 14 %
ERYTHROCYTE [DISTWIDTH] IN BLOOD: 14.4 % (ref 11–15)
FASTING DURATION TIME PATIENT: 12 HOURS (ref 0–999)
FASTING DURATION TIME PATIENT: 12 HOURS (ref 0–999)
GFR SERPLBLD BASED ON 1.73 SQ M-ARVRAT: >90 ML/MIN
GLOBULIN SER-MCNC: 3.2 G/DL (ref 2–4)
GLUCOSE SERPL-MCNC: 93 MG/DL (ref 70–99)
HCT VFR BLD CALC: 41.3 % (ref 36–46.5)
HDLC SERPL-MCNC: 62 MG/DL
HGB BLD-MCNC: 12.9 G/DL (ref 12–15.5)
IMM GRANULOCYTES # BLD AUTO: 0 K/MCL (ref 0–0.2)
IMM GRANULOCYTES # BLD: 0 %
LDLC SERPL CALC-MCNC: 111 MG/DL
LYMPHOCYTES # BLD: 1.9 K/MCL (ref 1–4)
LYMPHOCYTES NFR BLD: 27 %
MCH RBC QN AUTO: 28.9 PG (ref 26–34)
MCHC RBC AUTO-ENTMCNC: 31.2 G/DL (ref 32–36.5)
MCV RBC AUTO: 92.4 FL (ref 78–100)
MONOCYTES # BLD: 0.4 K/MCL (ref 0.3–0.9)
MONOCYTES NFR BLD: 5 %
NEUTROPHILS # BLD: 3.7 K/MCL (ref 1.8–7.7)
NEUTROPHILS NFR BLD: 53 %
NONHDLC SERPL-MCNC: 126 MG/DL
NRBC BLD MANUAL-RTO: 0 /100 WBC
PLATELET # BLD AUTO: 259 K/MCL (ref 140–450)
POTASSIUM SERPL-SCNC: 4.3 MMOL/L (ref 3.4–5.1)
PROT SERPL-MCNC: 6.9 G/DL (ref 6.4–8.2)
RBC # BLD: 4.47 MIL/MCL (ref 4–5.2)
SODIUM SERPL-SCNC: 140 MMOL/L (ref 135–145)
TRIGL SERPL-MCNC: 73 MG/DL
TSH SERPL-ACNC: 4.11 MCUNITS/ML (ref 0.35–5)
WBC # BLD: 7.1 K/MCL (ref 4.2–11)

## 2023-03-28 PROCEDURE — 80061 LIPID PANEL: CPT | Performed by: CLINICAL MEDICAL LABORATORY

## 2023-03-28 PROCEDURE — 82306 VITAMIN D 25 HYDROXY: CPT | Performed by: CLINICAL MEDICAL LABORATORY

## 2023-03-28 PROCEDURE — 80050 GENERAL HEALTH PANEL: CPT | Performed by: CLINICAL MEDICAL LABORATORY

## 2023-04-19 NOTE — TELEPHONE ENCOUNTER
Refill passed per [place] [facility] protocol. Requested Prescriptions   Pending Prescriptions Disp Refills    LEVOTHYROXINE 150 MCG Oral Tab [Pharmacy Med Name: LEVOTHYROXINE 150 MCG TABLET] 45 tablet 1     Sig: TAKE 1/2 TABLETS (75 MCG TOTAL) BY MOUTH BEFORE BREAKFAST.         Thyroid Medication Protocol Passed - 9/2/2021 12:00 AM        Passed - TSH in past 12 months        Passed - Last TSH value is normal     Lab Results   Component Value Date    TSH 1.650 10/12/2020                 Passed - Appointment in past 12 or next 3 months              Recent Outpatient Visits              2 months ago Sleep disturbance    [address] MD    Office Visit    10 months ago Sleep disturbance    Amy Simon MD    Office Visit    10 months ago Depression with anxiety    [address] Paco Gant PA-C    Office Visit    11 months ago Acquired hypothyroidism    Chasidy De Jesus MD    Office Visit    11 months ago Deneen Murders, [place] Lindalou Given, MD    Office Visit This is a limited assessment as patient accepted limited PO trials (2oz Puree, 1 teaspoon Moderately thick liquids). Patient demonstrated a functional oral stage for puree/moderately thick liquids marked by adequate retrieval from teaspoon, adequate containment and adequate transfer. Unable to adequately assess pharyngeal phase given patient declined further PO trials due to severe odynophagia, despite encouragement from RN/SLP to continue assessment.

## 2023-07-28 ENCOUNTER — HOSPITAL ENCOUNTER (OUTPATIENT)
Dept: GENERAL RADIOLOGY | Age: 58
Discharge: HOME | End: 2023-07-28
Attending: INTERNAL MEDICINE
Payer: COMMERCIAL

## 2023-07-28 DIAGNOSIS — R05.1 ACUTE COUGH: ICD-10-CM

## 2023-07-28 PROCEDURE — 71046 X-RAY EXAM CHEST 2 VIEWS: CPT | Performed by: INTERNAL MEDICINE

## 2024-04-30 ENCOUNTER — LAB REQUISITION (OUTPATIENT)
Dept: LAB | Age: 59
End: 2024-04-30

## 2024-04-30 DIAGNOSIS — M25.561 PAIN IN RIGHT KNEE: ICD-10-CM

## 2024-04-30 DIAGNOSIS — E55.9 VITAMIN D DEFICIENCY, UNSPECIFIED: ICD-10-CM

## 2024-04-30 DIAGNOSIS — Z00.00 ENCOUNTER FOR GENERAL ADULT MEDICAL EXAMINATION WITHOUT ABNORMAL FINDINGS: ICD-10-CM

## 2024-04-30 DIAGNOSIS — Z12.11 ENCOUNTER FOR SCREENING FOR MALIGNANT NEOPLASM OF COLON: ICD-10-CM

## 2024-05-09 ENCOUNTER — LAB SERVICES (OUTPATIENT)
Dept: LAB | Age: 59
End: 2024-05-09

## 2024-05-09 ENCOUNTER — HOSPITAL ENCOUNTER (OUTPATIENT)
Dept: GENERAL RADIOLOGY | Age: 59
Discharge: HOME | End: 2024-05-09
Attending: INTERNAL MEDICINE
Payer: COMMERCIAL

## 2024-05-09 DIAGNOSIS — M79.671 PAIN OF RIGHT HEEL: ICD-10-CM

## 2024-05-09 PROCEDURE — 85652 RBC SED RATE AUTOMATED: CPT | Performed by: CLINICAL MEDICAL LABORATORY

## 2024-05-09 PROCEDURE — 82306 VITAMIN D 25 HYDROXY: CPT | Performed by: CLINICAL MEDICAL LABORATORY

## 2024-05-09 PROCEDURE — 86038 ANTINUCLEAR ANTIBODIES: CPT | Performed by: CLINICAL MEDICAL LABORATORY

## 2024-05-09 PROCEDURE — 80061 LIPID PANEL: CPT | Performed by: CLINICAL MEDICAL LABORATORY

## 2024-05-09 PROCEDURE — 84439 ASSAY OF FREE THYROXINE: CPT | Performed by: CLINICAL MEDICAL LABORATORY

## 2024-05-09 PROCEDURE — 86140 C-REACTIVE PROTEIN: CPT | Performed by: CLINICAL MEDICAL LABORATORY

## 2024-05-09 PROCEDURE — 82274 ASSAY TEST FOR BLOOD FECAL: CPT | Performed by: CLINICAL MEDICAL LABORATORY

## 2024-05-09 PROCEDURE — 80050 GENERAL HEALTH PANEL: CPT | Performed by: CLINICAL MEDICAL LABORATORY

## 2024-05-09 PROCEDURE — 73650 X-RAY EXAM OF HEEL: CPT | Performed by: INTERNAL MEDICINE

## 2024-05-10 LAB
25(OH)D3+25(OH)D2 SERPL-MCNC: 20.8 NG/ML (ref 30–100)
ALBUMIN SERPL-MCNC: 4 G/DL (ref 3.6–5.1)
ALBUMIN/GLOB SERPL: 1.3 {RATIO} (ref 1–2.4)
ALP SERPL-CCNC: 78 UNITS/L (ref 45–117)
ALT SERPL-CCNC: 17 UNITS/L
ANA SER QL IA: NEGATIVE
ANION GAP SERPL CALC-SCNC: 6 MMOL/L (ref 7–19)
AST SERPL-CCNC: 11 UNITS/L
BASOPHILS # BLD: 0.1 K/MCL (ref 0–0.3)
BASOPHILS NFR BLD: 1 %
BILIRUB SERPL-MCNC: 0.3 MG/DL (ref 0.2–1)
BUN SERPL-MCNC: 14 MG/DL (ref 6–20)
BUN/CREAT SERPL: 25 (ref 7–25)
CALCIUM SERPL-MCNC: 9.1 MG/DL (ref 8.4–10.2)
CHLORIDE SERPL-SCNC: 110 MMOL/L (ref 97–110)
CHOLEST SERPL-MCNC: 201 MG/DL
CHOLEST/HDLC SERPL: 2.8 {RATIO}
CO2 SERPL-SCNC: 28 MMOL/L (ref 21–32)
CREAT SERPL-MCNC: 0.57 MG/DL (ref 0.51–0.95)
CRP SERPL-MCNC: <3 MG/L
DEPRECATED RDW RBC: 51.8 FL (ref 39–50)
EGFRCR SERPLBLD CKD-EPI 2021: >90 ML/MIN/{1.73_M2}
EOSINOPHIL # BLD: 1 K/MCL (ref 0–0.5)
EOSINOPHIL NFR BLD: 15 %
ERYTHROCYTE [DISTWIDTH] IN BLOOD: 15.1 % (ref 11–15)
ERYTHROCYTE [SEDIMENTATION RATE] IN BLOOD BY WESTERGREN METHOD: 18 MM/HR (ref 0–20)
FASTING DURATION TIME PATIENT: ABNORMAL H
GLOBULIN SER-MCNC: 3.1 G/DL (ref 2–4)
GLUCOSE SERPL-MCNC: 94 MG/DL (ref 70–99)
HCT VFR BLD CALC: 43.8 % (ref 36–46.5)
HDLC SERPL-MCNC: 73 MG/DL
HGB BLD-MCNC: 13.9 G/DL (ref 12–15.5)
IMM GRANULOCYTES # BLD AUTO: 0 K/MCL (ref 0–0.2)
IMM GRANULOCYTES # BLD: 0 %
LDLC SERPL CALC-MCNC: 110 MG/DL
LYMPHOCYTES # BLD: 1.8 K/MCL (ref 1–4)
LYMPHOCYTES NFR BLD: 27 %
MCH RBC QN AUTO: 29.6 PG (ref 26–34)
MCHC RBC AUTO-ENTMCNC: 31.7 G/DL (ref 32–36.5)
MCV RBC AUTO: 93.2 FL (ref 78–100)
MONOCYTES # BLD: 0.4 K/MCL (ref 0.3–0.9)
MONOCYTES NFR BLD: 5 %
NEUTROPHILS # BLD: 3.4 K/MCL (ref 1.8–7.7)
NEUTROPHILS NFR BLD: 52 %
NONHDLC SERPL-MCNC: 128 MG/DL
NRBC BLD MANUAL-RTO: 0 /100 WBC
PLATELET # BLD AUTO: 257 K/MCL (ref 140–450)
POTASSIUM SERPL-SCNC: 4.4 MMOL/L (ref 3.4–5.1)
PROT SERPL-MCNC: 7.1 G/DL (ref 6.4–8.2)
RBC # BLD: 4.7 MIL/MCL (ref 4–5.2)
SODIUM SERPL-SCNC: 140 MMOL/L (ref 135–145)
T4 FREE SERPL-MCNC: 0.7 NG/DL (ref 0.8–1.5)
TRIGL SERPL-MCNC: 88 MG/DL
TSH SERPL-ACNC: 9.61 MCUNITS/ML (ref 0.35–5)
WBC # BLD: 6.7 K/MCL (ref 4.2–11)

## 2024-05-11 LAB — HEMOCCULT STL QL: NEGATIVE

## 2024-10-14 ENCOUNTER — LAB REQUISITION (OUTPATIENT)
Dept: LAB | Age: 59
End: 2024-10-14

## 2024-10-14 DIAGNOSIS — E03.9 HYPOTHYROIDISM, UNSPECIFIED: ICD-10-CM

## 2024-10-14 DIAGNOSIS — E55.9 VITAMIN D DEFICIENCY, UNSPECIFIED: ICD-10-CM

## 2024-10-29 ENCOUNTER — LAB SERVICES (OUTPATIENT)
Dept: LAB | Age: 59
End: 2024-10-29

## 2024-10-29 PROCEDURE — 82306 VITAMIN D 25 HYDROXY: CPT | Performed by: CLINICAL MEDICAL LABORATORY

## 2024-10-29 PROCEDURE — 84443 ASSAY THYROID STIM HORMONE: CPT | Performed by: CLINICAL MEDICAL LABORATORY

## 2024-10-30 LAB
25(OH)D3+25(OH)D2 SERPL-MCNC: 25.5 NG/ML (ref 30–100)
TSH SERPL-ACNC: 1.8 MCUNITS/ML (ref 0.35–5)

## 2025-02-16 ENCOUNTER — HOSPITAL ENCOUNTER (OUTPATIENT)
Age: 60
Discharge: HOME | End: 2025-02-16
Payer: COMMERCIAL

## 2025-02-16 VITALS
HEART RATE: 86 BPM | DIASTOLIC BLOOD PRESSURE: 66 MMHG | SYSTOLIC BLOOD PRESSURE: 121 MMHG | TEMPERATURE: 98 F | OXYGEN SATURATION: 98 % | RESPIRATION RATE: 18 BRPM

## 2025-02-16 DIAGNOSIS — J98.01 BRONCHOSPASM: ICD-10-CM

## 2025-02-16 DIAGNOSIS — B34.9 VIRAL ILLNESS: Primary | ICD-10-CM

## 2025-02-16 LAB
POCT INFLUENZA A: NEGATIVE
POCT INFLUENZA B: NEGATIVE
SARS-COV-2 RNA RESP QL NAA+PROBE: NOT DETECTED

## 2025-02-16 RX ORDER — BENZONATATE 100 MG/1
100 CAPSULE ORAL 3 TIMES DAILY PRN
Qty: 30 CAPSULE | Refills: 0 | Status: SHIPPED | OUTPATIENT
Start: 2025-02-16 | End: 2025-03-18

## 2025-02-16 RX ORDER — ALBUTEROL SULFATE 90 UG/1
2 INHALANT RESPIRATORY (INHALATION) ONCE
Status: COMPLETED | OUTPATIENT
Start: 2025-02-16 | End: 2025-02-16

## 2025-02-16 RX ORDER — ALBUTEROL SULFATE 90 UG/1
2 INHALANT RESPIRATORY (INHALATION) EVERY 4 HOURS PRN
Qty: 1 EACH | Refills: 0 | Status: SHIPPED | OUTPATIENT
Start: 2025-02-16 | End: 2025-03-18

## 2025-02-16 NOTE — ED PROVIDER NOTES
Patient Seen in: [facility]      History     Chief Complaint   Patient presents with    Sore Throat    Cough    Nasal Congestion     Stated Complaint: Sore Throat  Subjective:   Meghan is a 59-year-old female presenting to the immediate care complaining of cough, congestion, rhinorrhea, postnasal drip and a scratchy throat for the past 3 days.  Patient states that she heard some \"squeaking\" when she was breathing last night so she came in today for evaluation.  Patient states that she has not had any episodes of respiratory distress or severe difficulty breathing.  Denies any throat pain, painful or difficulty swallowing or voice changes.  Patient has not had any fever, chest pain, shortness of breath, abdominal pain or vomiting.  She is eating and drinking well and is well-hydrated.  She denies any other concerns or complaints.        Objective:   Past Medical History:    Breast lesion    benign - bx negative    Hemorrhage    Hypothyroidism    drug therapy            Past Surgical History:   Procedure Laterality Date      ,     D & c      Pterygium excision - od - right eye Right     Pterygium excision - os - left eye Left               Social History     Socioeconomic History    Marital status:    Tobacco Use    Smoking status: Never    Smokeless tobacco: Never   Vaping Use    Vaping status: Never Used   Substance and Sexual Activity    Alcohol use: Yes     Comment: mixed drinks - occasionally    Drug use: No   Other Topics Concern    Caffeine Concern Yes     Comment: coffee, 2 cups/day            Review of Systems    Positive for stated complaint: Sore Throat, Cough, and Nasal Congestion    Other systems are as noted in HPI.  Constitutional and vital signs reviewed.      All other systems reviewed and negative except as noted above.    Physical Exam     ED Triage Vitals [25 1132]   /66   Pulse 86   Resp 18   Temp 98 °F (36.7 °C)   Temp src Oral   SpO2 98 %   O2  Device None (Room air)     Current:/66   Pulse 86   Temp 98 °F (36.7 °C) (Oral)   Resp 18   LMP 01/05/2016   SpO2 98%     Physical Exam  Vitals and nursing note reviewed.   Constitutional:       General: She is not in acute distress.     Appearance: Normal appearance. She is not ill-appearing, toxic-appearing or diaphoretic.   HENT:      Head: Normocephalic.      Right Ear: Tympanic membrane, ear canal and external ear normal.      Left Ear: Tympanic membrane, ear canal and external ear normal.      Nose: Congestion and rhinorrhea present.      Mouth/Throat:      Mouth: Mucous membranes are moist. No oral lesions.      Pharynx: Oropharynx is clear. Uvula midline. Posterior oropharyngeal erythema and postnasal drip present. No pharyngeal swelling, oropharyngeal exudate or uvula swelling.      Tonsils: No tonsillar exudate or tonsillar abscesses. 0 on the right. 0 on the left.      Comments: No trismus  Eyes:      Conjunctiva/sclera: Conjunctivae normal.   Cardiovascular:      Rate and Rhythm: Normal rate and regular rhythm.      Pulses: Normal pulses.      Heart sounds: Normal heart sounds.   Pulmonary:      Effort: Pulmonary effort is normal. No tachypnea, bradypnea, accessory muscle usage, prolonged expiration, respiratory distress or retractions.      Breath sounds: Normal breath sounds and air entry. No stridor, decreased air movement or transmitted upper airway sounds. No decreased breath sounds, wheezing, rhonchi or rales.      Comments: Very mildly diminished breath sounds throughout all lung fields.  Abdominal:      General: Abdomen is flat.   Musculoskeletal:         General: Normal range of motion.      Cervical back: Normal range of motion.   Skin:     General: Skin is warm.      Capillary Refill: Capillary refill takes less than 2 seconds.   Neurological:      General: No focal deficit present.      Mental Status: She is alert and oriented to person, place, and time.   Psychiatric:         Mood  and Affect: Mood normal.         Behavior: Behavior normal.         Thought Content: Thought content normal.         Judgment: Judgment normal.         ED Course   Radiology:    No orders to display     Labs Reviewed   POCT FLU TEST - Normal    Narrative:     This assay is a rapid molecular in vitro test utilizing nucleic acid amplification of influenza A and B viral RNA.   RAPID SARS-COV-2 BY PCR - Normal       MDM     Medical Decision Making  Differential diagnoses reflecting the complexity of care include but are not limited to viral versus bacterial etiology of upper respiratory complaints.    Comorbidities that add complexity to management include: None  History obtained by an independent source was from: Patient  Patient is well appearing, non-toxic and in no acute distress.  Vital signs are stable.     Influenza test is negative.  COVID test is negative.  There are no signs of infection on physical exam.  History and physical exam are consistent with viral illness.    Patient is very mildly diminished breath sounds throughout all lung fields.  No advantageous breath sounds.  Was given an albuterol inhaler here in the immediate care and felt better.  Sent a prescription for albuterol to be used at home every 6 hours as needed.  Also sent a prescription for Tessalon Perles for the cough.  Recommended that patient drink plenty of fluids, use Tylenol and Motrin for pain or fever, use Flonase for nasal congestion and may use over-the-counter medications for congestion as needed.  Recommended that if the patient develops any chest pain, respiratory complaints, fever that does not improve with medications or any other concerning complaints they should go to the emergency department.  ED precautions discussed.  Patient (guardian) advised to follow up with PCP in 2-3 days.  Patient (guardian) agrees with this plan of care.  Patient (guardian) verbalizes understanding of discharge instructions and plan of  care.      Amount and/or Complexity of Data Reviewed  Labs: ordered. Decision-making details documented in ED Course.    Risk  OTC drugs.  Prescription drug management.        Disposition and Plan     Clinical Impression:  1. Viral illness    2. Bronchospasm         Disposition:  Discharge  2/16/2025 12:13 pm    Follow-up:  Casey Seo MD  [address] [place] [address]  [phone]                Medications Prescribed:  Discharge Medication List as of 2/16/2025 12:14 PM        START taking these medications    Details   albuterol 108 (90 Base) MCG/ACT Inhalation Aero Soln Inhale 2 puffs into the lungs every 4 (four) hours as needed for Wheezing., Normal, Disp-1 each, R-0      benzonatate 100 MG Oral Cap Take 1 capsule (100 mg total) by mouth 3 (three) times daily as needed for cough., Normal, Disp-30 capsule, R-0

## 2025-02-16 NOTE — DISCHARGE INSTRUCTIONS
Influenza test is negative.  COVID test is negative. There are no signs of infection on physical exam.  This is likely a viral illness.    Sent you prescription for albuterol, this is a medication that will help the coughing.  Please use it up to every 6 hours as needed with a spacer.  I also sent you a prescription for Tessalon Perles for your cough.  Please be sure to drink plenty of fluids, use Tylenol and Motrin for pain or fever.  Use Flonase and Mucinex for congestion.  If you develop any respiratory complaints, fever that does not improve with medications or any other concerning complaints you should go to the emergency department. Otherwise follow up with your primary care provider.

## 2025-06-12 ENCOUNTER — APPOINTMENT (OUTPATIENT)
Dept: LAB | Age: 60
End: 2025-06-12

## 2025-06-12 ENCOUNTER — LAB REQUISITION (OUTPATIENT)
Dept: LAB | Age: 60
End: 2025-06-12

## 2025-06-12 DIAGNOSIS — E55.9 VITAMIN D DEFICIENCY, UNSPECIFIED: ICD-10-CM

## 2025-06-12 DIAGNOSIS — Z00.00 ENCOUNTER FOR GENERAL ADULT MEDICAL EXAMINATION WITHOUT ABNORMAL FINDINGS: ICD-10-CM

## 2025-06-12 LAB
25(OH)D3+25(OH)D2 SERPL-MCNC: 21.9 NG/ML (ref 30–100)
ALBUMIN SERPL-MCNC: 3.8 G/DL (ref 3.4–5)
ALBUMIN/GLOB SERPL: 1.2 {RATIO} (ref 1–2.4)
ALP SERPL-CCNC: 80 UNITS/L (ref 45–117)
ALT SERPL-CCNC: 19 UNITS/L
ANION GAP SERPL CALC-SCNC: 8 MMOL/L (ref 7–19)
AST SERPL-CCNC: 11 UNITS/L
BASOPHILS # BLD: 0.1 K/MCL (ref 0–0.3)
BASOPHILS NFR BLD: 1 %
BILIRUB SERPL-MCNC: 0.4 MG/DL (ref 0.2–1)
BUN SERPL-MCNC: 16 MG/DL (ref 6–20)
BUN/CREAT SERPL: 25 (ref 7–25)
CALCIUM SERPL-MCNC: 9.3 MG/DL (ref 8.4–10.2)
CHLORIDE SERPL-SCNC: 109 MMOL/L (ref 97–110)
CHOLEST SERPL-MCNC: 212 MG/DL
CHOLEST/HDLC SERPL: 3.2 {RATIO}
CO2 SERPL-SCNC: 27 MMOL/L (ref 21–32)
CREAT SERPL-MCNC: 0.64 MG/DL (ref 0.51–0.95)
DEPRECATED RDW RBC: 50.9 FL (ref 39–50)
EGFRCR SERPLBLD CKD-EPI 2021: >90 ML/MIN/{1.73_M2}
EOSINOPHIL # BLD: 0.9 K/MCL (ref 0–0.5)
EOSINOPHIL NFR BLD: 14 %
ERYTHROCYTE [DISTWIDTH] IN BLOOD: 14.6 % (ref 11–15)
FASTING DURATION TIME PATIENT: 8 HOURS (ref 0–999)
GLOBULIN SER-MCNC: 3.3 G/DL (ref 2–4)
GLUCOSE SERPL-MCNC: 91 MG/DL (ref 70–99)
HCT VFR BLD CALC: 44.7 % (ref 36–46.5)
HDLC SERPL-MCNC: 66 MG/DL
HGB BLD-MCNC: 13.6 G/DL (ref 12–15.5)
IMM GRANULOCYTES # BLD AUTO: 0 K/MCL (ref 0–0.2)
IMM GRANULOCYTES # BLD: 0 %
LDLC SERPL CALC-MCNC: 118 MG/DL
LYMPHOCYTES # BLD: 1.9 K/MCL (ref 1–4)
LYMPHOCYTES NFR BLD: 30 %
MCH RBC QN AUTO: 28.7 PG (ref 26–34)
MCHC RBC AUTO-ENTMCNC: 30.4 G/DL (ref 32–36.5)
MCV RBC AUTO: 94.3 FL (ref 78–100)
MONOCYTES # BLD: 0.4 K/MCL (ref 0.3–0.9)
MONOCYTES NFR BLD: 6 %
NEUTROPHILS # BLD: 3.1 K/MCL (ref 1.8–7.7)
NEUTROPHILS NFR BLD: 49 %
NONHDLC SERPL-MCNC: 146 MG/DL
NRBC BLD MANUAL-RTO: 0 /100 WBC
PLATELET # BLD AUTO: 254 K/MCL (ref 140–450)
POTASSIUM SERPL-SCNC: 4.3 MMOL/L (ref 3.4–5.1)
PROT SERPL-MCNC: 7.1 G/DL (ref 6.4–8.2)
RBC # BLD: 4.74 MIL/MCL (ref 4–5.2)
SODIUM SERPL-SCNC: 140 MMOL/L (ref 135–145)
TRIGL SERPL-MCNC: 142 MG/DL
TSH SERPL-ACNC: 3.41 MCUNITS/ML (ref 0.35–5)
WBC # BLD: 6.3 K/MCL (ref 4.2–11)

## 2025-06-12 PROCEDURE — 80053 COMPREHEN METABOLIC PANEL: CPT | Performed by: CLINICAL MEDICAL LABORATORY

## 2025-06-12 PROCEDURE — 80061 LIPID PANEL: CPT | Performed by: CLINICAL MEDICAL LABORATORY

## 2025-06-12 PROCEDURE — PSEU8250 COMPREHENSIVE METABOLIC PANEL: Performed by: CLINICAL MEDICAL LABORATORY

## 2025-06-12 PROCEDURE — PSEU8168 THYROID STIMULATING HORMONE REFLEX: Performed by: CLINICAL MEDICAL LABORATORY

## 2025-06-12 PROCEDURE — PSEU8229 VITAMIN D -25 HYDROXY: Performed by: CLINICAL MEDICAL LABORATORY

## 2025-06-12 PROCEDURE — 82306 VITAMIN D 25 HYDROXY: CPT | Performed by: CLINICAL MEDICAL LABORATORY

## 2025-06-12 PROCEDURE — 84443 ASSAY THYROID STIM HORMONE: CPT | Performed by: CLINICAL MEDICAL LABORATORY

## 2025-06-12 PROCEDURE — 85025 COMPLETE CBC W/AUTO DIFF WBC: CPT | Performed by: CLINICAL MEDICAL LABORATORY

## 2025-06-12 PROCEDURE — 36415 COLL VENOUS BLD VENIPUNCTURE: CPT | Performed by: CLINICAL MEDICAL LABORATORY

## 2025-06-12 PROCEDURE — PSEU8135 LIPID PANEL WITH REFLEX: Performed by: CLINICAL MEDICAL LABORATORY

## (undated) NOTE — LETTER
03/11/19        Santana Guerra  [address]      Dear Nohelia Barker,    [facility] records indicate that you have outstanding lab work and or testing that was ordered for you and has not yet been completed:  Orders Placed This Encounter

## (undated) NOTE — LETTER
Date & Time: 2/16/2025, 12:13 PM  Patient: Meghan Guerra  Encounter Provider(s):    Ros Gonzalez APRN       To Whom It May Concern:    Meghan Guerra was seen and treated in our department on 2/16/2025. She  was evaluated in the immediate care today .    If you have any questions or concerns, please do not hesitate to call.        _____________________________  Physician/APC Signature

## (undated) NOTE — LETTER
Date & Time: 11/7/2022, 3:06 PM  Patient: Jose Roberto Late  Encounter Provider(s):    LUMA Iverson       To Whom It May Concern:    Deion Moreno was seen and treated in our department on 11/7/2022. She should not return to work until 11/11/2022. If you have any questions or concerns, please do not hesitate to call.        _____________________________  Xiomara Cruz.  Flori Marcial

## (undated) NOTE — LETTER
09/05/19        Sally Party Charlie  [address]      Dear Marybeth Lyon,    [facility] records indicate that you have outstanding lab work and or testing that was ordered for you and has not yet been completed:  Orders Placed This Encounter

## (undated) NOTE — LETTER
10/26/2017              [address] [facility] [address]         Dear Zacarias Harrington,    It was a pleasure to see you. Blood test is good with normal blood counts, sugar, liver, thyroid, urine, lipids and kidney tests.   .  There

## (undated) NOTE — ED AVS SNAPSHOT
[facility] in [facility] [address]  [address] [facility] [place]    Phone:  [phone]    Fax:  [phone]           Ashlie Causey   MRN: [id number]    Department:  [facility] in [place]   Date of Vi BRONCHITIS, ANTIOBIOTIC TREATMENT (ADULT) (ENGLISH)      Disclosure     Insurance plans vary and the physician(s) referred by the Immediate Care may not be covered by your plan.   It is possible that the physician may not participate in your health insuran IF THERE IS ANY CHANGE OR WORSENING OF YOUR CONDITION, CALL YOUR PRIMARY CARE PHYSICIAN AT ONCE OR GO TO THE EMERGENCY DEPARTMENT.     If you have been prescribed any medication(s), please fill your prescription right away and begin taking the medication(s) you to explore options for quitting.     - If you have concerns related to behavioral health issues or thoughts of harming yourself, contact [facility] at [phone].     - If you don’t have insurance, Jacinda Meehan

## (undated) NOTE — LETTER
April 2, 2018         Precious Varghese MD  [facility]      Patient: Timothy Velasquez   YOB: 1965   Date of Visit: 4/2/2018       Dear Dr. Brittany Centeno MD,    I saw your patient, Timothy Velasquez, on 4/2/2018.  Encl